# Patient Record
Sex: MALE | Race: WHITE | Employment: FULL TIME | URBAN - METROPOLITAN AREA
[De-identification: names, ages, dates, MRNs, and addresses within clinical notes are randomized per-mention and may not be internally consistent; named-entity substitution may affect disease eponyms.]

---

## 2017-01-17 ENCOUNTER — ALLSCRIPTS OFFICE VISIT (OUTPATIENT)
Dept: OTHER | Facility: OTHER | Age: 37
End: 2017-01-17

## 2017-04-25 ENCOUNTER — GENERIC CONVERSION - ENCOUNTER (OUTPATIENT)
Dept: OTHER | Facility: OTHER | Age: 37
End: 2017-04-25

## 2017-04-26 ENCOUNTER — ALLSCRIPTS OFFICE VISIT (OUTPATIENT)
Dept: OTHER | Facility: OTHER | Age: 37
End: 2017-04-26

## 2017-08-02 ENCOUNTER — ALLSCRIPTS OFFICE VISIT (OUTPATIENT)
Dept: OTHER | Facility: OTHER | Age: 37
End: 2017-08-02

## 2018-01-12 VITALS
DIASTOLIC BLOOD PRESSURE: 68 MMHG | SYSTOLIC BLOOD PRESSURE: 124 MMHG | HEIGHT: 68 IN | TEMPERATURE: 100.4 F | WEIGHT: 221.25 LBS | BODY MASS INDEX: 33.53 KG/M2

## 2018-01-14 VITALS — BODY MASS INDEX: 33.45 KG/M2 | WEIGHT: 220 LBS | SYSTOLIC BLOOD PRESSURE: 130 MMHG | DIASTOLIC BLOOD PRESSURE: 68 MMHG

## 2018-01-14 VITALS
DIASTOLIC BLOOD PRESSURE: 62 MMHG | SYSTOLIC BLOOD PRESSURE: 124 MMHG | WEIGHT: 217.38 LBS | BODY MASS INDEX: 32.94 KG/M2 | HEIGHT: 68 IN

## 2019-04-12 ENCOUNTER — OFFICE VISIT (OUTPATIENT)
Dept: FAMILY MEDICINE CLINIC | Facility: CLINIC | Age: 39
End: 2019-04-12
Payer: COMMERCIAL

## 2019-04-12 VITALS
WEIGHT: 212 LBS | TEMPERATURE: 98.5 F | BODY MASS INDEX: 31.4 KG/M2 | SYSTOLIC BLOOD PRESSURE: 130 MMHG | DIASTOLIC BLOOD PRESSURE: 82 MMHG | HEIGHT: 69 IN

## 2019-04-12 DIAGNOSIS — M53.3 PAIN IN THE COCCYX: Primary | ICD-10-CM

## 2019-04-12 DIAGNOSIS — F90.0 ATTENTION DEFICIT HYPERACTIVITY DISORDER (ADHD), PREDOMINANTLY INATTENTIVE TYPE: ICD-10-CM

## 2019-04-12 DIAGNOSIS — Z23 ENCOUNTER FOR VACCINATION: ICD-10-CM

## 2019-04-12 PROCEDURE — 90471 IMMUNIZATION ADMIN: CPT

## 2019-04-12 PROCEDURE — 99213 OFFICE O/P EST LOW 20 MIN: CPT | Performed by: FAMILY MEDICINE

## 2019-04-12 PROCEDURE — 3008F BODY MASS INDEX DOCD: CPT | Performed by: FAMILY MEDICINE

## 2019-04-12 PROCEDURE — 90715 TDAP VACCINE 7 YRS/> IM: CPT

## 2019-04-12 RX ORDER — MELOXICAM 15 MG/1
15 TABLET ORAL DAILY
Qty: 30 TABLET | Refills: 1 | Status: SHIPPED | OUTPATIENT
Start: 2019-04-12 | End: 2019-07-16 | Stop reason: SDUPTHER

## 2019-04-12 RX ORDER — DEXTROAMPHETAMINE SACCHARATE, AMPHETAMINE ASPARTATE, DEXTROAMPHETAMINE SULFATE AND AMPHETAMINE SULFATE 5; 5; 5; 5 MG/1; MG/1; MG/1; MG/1
1 TABLET ORAL 2 TIMES DAILY
COMMUNITY
Start: 2013-04-04 | End: 2019-04-12 | Stop reason: SDUPTHER

## 2019-04-12 RX ORDER — DEXTROAMPHETAMINE SACCHARATE, AMPHETAMINE ASPARTATE, DEXTROAMPHETAMINE SULFATE AND AMPHETAMINE SULFATE 5; 5; 5; 5 MG/1; MG/1; MG/1; MG/1
1 TABLET ORAL 2 TIMES DAILY
Qty: 60 TABLET | Refills: 0 | Status: SHIPPED | OUTPATIENT
Start: 2019-04-12 | End: 2019-09-24 | Stop reason: SDUPTHER

## 2019-07-15 ENCOUNTER — OFFICE VISIT (OUTPATIENT)
Dept: FAMILY MEDICINE CLINIC | Facility: CLINIC | Age: 39
End: 2019-07-15
Payer: COMMERCIAL

## 2019-07-15 VITALS
TEMPERATURE: 97.2 F | BODY MASS INDEX: 31.37 KG/M2 | DIASTOLIC BLOOD PRESSURE: 70 MMHG | WEIGHT: 207 LBS | HEIGHT: 68 IN | SYSTOLIC BLOOD PRESSURE: 132 MMHG

## 2019-07-15 DIAGNOSIS — S83.207A ACUTE MENISCAL TEAR OF LEFT KNEE, INITIAL ENCOUNTER: Primary | ICD-10-CM

## 2019-07-15 PROCEDURE — 99213 OFFICE O/P EST LOW 20 MIN: CPT | Performed by: FAMILY MEDICINE

## 2019-07-15 NOTE — PROGRESS NOTES
Assessment/Plan:  Patient will start meloxicam daily  Patient will go for x-ray plus or minus MRI  Diagnoses and all orders for this visit:    Acute meniscal tear of left knee, initial encounter  -     XR knee 3 vw right non injury; Future  -     MRI knee left  wo contrast; Future          Subjective:      Patient ID: Aleena Ibarra is a 43 y o  male  Patient is here with left knee pain over the past 3 weeks  The patient has been limping for 3 weeks  Patient did go for jugular day before  Patient did jump down from his pickup truck that morning before pain ensued  The no significant swelling noted  He pain is on the medial aspect  Patient does have cadaver ACL repair left knee from previous injury  This occurred when he was 23years old  Patient did use Ace wrap as well as ice  No redness or bruising noted  The patient does notice some popping and cracking as well as instability      The following portions of the patient's history were reviewed and updated as appropriate: allergies, current medications, past family history, past medical history, past social history, past surgical history and problem list     Review of Systems   Constitutional: Negative  HENT: Negative  Eyes: Negative  Respiratory: Negative  Cardiovascular: Negative  Gastrointestinal: Negative  Endocrine: Negative  Genitourinary: Negative  Musculoskeletal: Positive for arthralgias and gait problem  Skin: Negative  Allergic/Immunologic: Negative  Hematological: Negative  Psychiatric/Behavioral: Negative  Objective:      /70 (BP Location: Right arm, Patient Position: Sitting, Cuff Size: Adult)   Temp (!) 97 2 °F (36 2 °C) (Tympanic)   Ht 5' 8" (1 727 m)   Wt 93 9 kg (207 lb)   BMI 31 47 kg/m²          Physical Exam   Constitutional: He appears well-developed and well-nourished  Musculoskeletal: Normal range of motion  He exhibits tenderness  Left knee no effusion noted    Patient does not have any joint line tenderness  Patient does have some pain with testing MCL  No pain testing LCL  Negative of Lachman test   Positive Apley test    Nursing note and vitals reviewed

## 2019-07-16 DIAGNOSIS — M53.3 PAIN IN THE COCCYX: ICD-10-CM

## 2019-07-16 RX ORDER — MELOXICAM 15 MG/1
15 TABLET ORAL DAILY
Qty: 30 TABLET | Refills: 1 | Status: SHIPPED | OUTPATIENT
Start: 2019-07-16 | End: 2019-08-27 | Stop reason: SDUPTHER

## 2019-08-01 ENCOUNTER — TELEPHONE (OUTPATIENT)
Dept: FAMILY MEDICINE CLINIC | Facility: CLINIC | Age: 39
End: 2019-08-01

## 2019-08-01 DIAGNOSIS — S83.207A ACUTE MENISCAL TEAR OF LEFT KNEE, INITIAL ENCOUNTER: Primary | ICD-10-CM

## 2019-08-01 NOTE — TELEPHONE ENCOUNTER
Call patient  MRI of the knee not being covered  Patient will need to do physical therapy prior to coverage  Referral to physical therapy    Other option would be going to Orthopedics

## 2019-08-01 NOTE — TELEPHONE ENCOUNTER
Pt's insurance denied the auth for MRI knee  The reason for the denial: "documentation submitted failed to indicate the patient has had a recent (within the last 3 months) 6 week trial of doctor prescribed treatment  Treatments may include rest, ice, wraps, and/or elevating leg, medications/drugs to treat pain/swelling, oral or injected steroids, home workout or exercise program, cross training, bracing/splinting and/or casting, or medications injected into the knee "  Please advise how you'd like to proceed       (7/15 was the first visit the patient had for this issue )

## 2019-08-01 NOTE — TELEPHONE ENCOUNTER
Spoke with patient he is aware of the response  He is still in a lot of pain and his mobility is being hindered  Patient would like to see Ortho  Amb Ref was put in the system for ortho and patient was given the phone number to call and schedule

## 2019-08-16 VITALS
DIASTOLIC BLOOD PRESSURE: 78 MMHG | SYSTOLIC BLOOD PRESSURE: 130 MMHG | HEIGHT: 68 IN | BODY MASS INDEX: 31.43 KG/M2 | WEIGHT: 207.4 LBS | HEART RATE: 48 BPM

## 2019-08-16 DIAGNOSIS — M25.562 ACUTE PAIN OF LEFT KNEE: Primary | ICD-10-CM

## 2019-08-16 DIAGNOSIS — M23.8X2 LAXITY OF KNEE JOINT, LEFT: ICD-10-CM

## 2019-08-16 PROCEDURE — 99204 OFFICE O/P NEW MOD 45 MIN: CPT | Performed by: EMERGENCY MEDICINE

## 2019-08-27 DIAGNOSIS — M53.3 PAIN IN THE COCCYX: ICD-10-CM

## 2019-08-27 RX ORDER — MELOXICAM 15 MG/1
15 TABLET ORAL DAILY
Qty: 30 TABLET | Refills: 1 | Status: SHIPPED | OUTPATIENT
Start: 2019-08-27 | End: 2019-09-24 | Stop reason: SDUPTHER

## 2019-08-27 NOTE — TELEPHONE ENCOUNTER
Asking for refill on meloxicam  Pt states the ortho told him to have the med filled by us  Pt is in pain and down to one pill  Please address

## 2019-09-24 ENCOUNTER — OFFICE VISIT (OUTPATIENT)
Dept: FAMILY MEDICINE CLINIC | Facility: CLINIC | Age: 39
End: 2019-09-24
Payer: COMMERCIAL

## 2019-09-24 VITALS
TEMPERATURE: 97 F | BODY MASS INDEX: 31.67 KG/M2 | DIASTOLIC BLOOD PRESSURE: 76 MMHG | SYSTOLIC BLOOD PRESSURE: 122 MMHG | WEIGHT: 209 LBS | HEIGHT: 68 IN

## 2019-09-24 DIAGNOSIS — Z88.9 MULTIPLE ALLERGIES: ICD-10-CM

## 2019-09-24 DIAGNOSIS — F90.0 ATTENTION DEFICIT HYPERACTIVITY DISORDER (ADHD), PREDOMINANTLY INATTENTIVE TYPE: ICD-10-CM

## 2019-09-24 DIAGNOSIS — S83.207D ACUTE MENISCAL TEAR OF LEFT KNEE, SUBSEQUENT ENCOUNTER: ICD-10-CM

## 2019-09-24 DIAGNOSIS — M53.3 PAIN IN THE COCCYX: ICD-10-CM

## 2019-09-24 DIAGNOSIS — Z23 NEEDS FLU SHOT: Primary | ICD-10-CM

## 2019-09-24 PROCEDURE — 99213 OFFICE O/P EST LOW 20 MIN: CPT | Performed by: FAMILY MEDICINE

## 2019-09-24 PROCEDURE — 3008F BODY MASS INDEX DOCD: CPT | Performed by: FAMILY MEDICINE

## 2019-09-24 PROCEDURE — 90682 RIV4 VACC RECOMBINANT DNA IM: CPT

## 2019-09-24 PROCEDURE — 90471 IMMUNIZATION ADMIN: CPT

## 2019-09-24 RX ORDER — MELOXICAM 15 MG/1
15 TABLET ORAL DAILY
Qty: 30 TABLET | Refills: 1 | Status: SHIPPED | OUTPATIENT
Start: 2019-09-24 | End: 2021-12-09 | Stop reason: ALTCHOICE

## 2019-09-24 RX ORDER — DEXTROAMPHETAMINE SACCHARATE, AMPHETAMINE ASPARTATE, DEXTROAMPHETAMINE SULFATE AND AMPHETAMINE SULFATE 5; 5; 5; 5 MG/1; MG/1; MG/1; MG/1
1 TABLET ORAL 2 TIMES DAILY
Qty: 60 TABLET | Refills: 0 | Status: SHIPPED | OUTPATIENT
Start: 2019-09-24 | End: 2020-05-20 | Stop reason: SDUPTHER

## 2019-09-24 NOTE — PROGRESS NOTES
Assessment/Plan:  He flu shot given at this time  Patient will observe URI/out allergic symptoms  Guidance given  Patient will follow up with Orthopedics after MRI  Follow-up in 6 months       Diagnoses and all orders for this visit:    Needs flu shot  -     influenza vaccine, [id number], quadrivalent, recombinant, PF, 0 5 mL, for patients 18 yr+ (FLUBLOK)    Attention deficit hyperactivity disorder (ADHD), predominantly inattentive type  -     amphetamine-dextroamphetamine (ADDERALL) 20 mg tablet; Take 1 tablet (20 mg total) by mouth 2 (two) times a dayMax Daily Amount: 40 mg    Pain in the coccyx  -     meloxicam (MOBIC) 15 mg tablet; Take 1 tablet (15 mg total) by mouth daily    Acute meniscal tear of left knee, subsequent encounter    Multiple allergies            Subjective:        Patient ID: Destiny Chung is a 43 y o  male  Patient is here with ongoing left knee pain  Patient using meloxicam daily which is helping to some degree  Patient has not gone for MRI yet  Patient has seen Orthopedics  The patient okay with flu shot today  No fever chills or other URI symptoms other than mild sore throat  The following portions of the patient's history were reviewed and updated as appropriate: allergies, current medications, past family history, past medical history, past social history, past surgical history and problem list       Review of Systems   Constitutional: Negative  HENT: Positive for sore throat  Eyes: Negative  Respiratory: Negative  Cardiovascular: Negative  Gastrointestinal: Negative  Endocrine: Negative  Genitourinary: Negative  Musculoskeletal: Positive for arthralgias  Skin: Negative  Allergic/Immunologic: Negative  Neurological: Negative  Hematological: Negative  Psychiatric/Behavioral: Negative  Objective:      BMI Counseling: Body mass index is 31 78 kg/m²  Discussed the patient's BMI with him   The BMI is above normal  Nutrition recommendations include reducing portion sizes  /76 (BP Location: Right arm, Patient Position: Sitting, Cuff Size: Large)   Temp (!) 97 °F (36 1 °C) (Tympanic)   Ht 5' 8" (1 727 m)   Wt 94 8 kg (209 lb)   BMI 31 78 kg/m²          Physical Exam   Constitutional: He is oriented to person, place, and time  He appears well-developed and well-nourished  No distress  HENT:   Head: Normocephalic  Right Ear: Hearing, tympanic membrane, external ear and ear canal normal    Left Ear: Hearing, tympanic membrane, external ear and ear canal normal    Mouth/Throat: Oropharyngeal exudate present  Eyes: Pupils are equal, round, and reactive to light  EOM are normal  Right eye exhibits no discharge  Left eye exhibits no discharge  No scleral icterus  Neck: Normal range of motion  Neck supple  No thyromegaly present  Cardiovascular: Normal rate, regular rhythm, normal heart sounds and intact distal pulses  Exam reveals no gallop and no friction rub  No murmur heard  Pulmonary/Chest: Effort normal and breath sounds normal  No respiratory distress  He has no wheezes  He has no rales  He exhibits no tenderness  Musculoskeletal: Normal range of motion  He exhibits deformity  He exhibits no edema or tenderness  Lymphadenopathy:     He has no cervical adenopathy  Neurological: He is oriented to person, place, and time  No cranial nerve deficit  He exhibits normal muscle tone  Coordination normal    Skin: Skin is warm and dry  No rash noted  He is not diaphoretic  No erythema  No pallor  Psychiatric: He has a normal mood and affect  His behavior is normal  Judgment and thought content normal    Nursing note and vitals reviewed

## 2020-05-20 ENCOUNTER — OFFICE VISIT (OUTPATIENT)
Dept: FAMILY MEDICINE CLINIC | Facility: CLINIC | Age: 40
End: 2020-05-20
Payer: COMMERCIAL

## 2020-05-20 VITALS
WEIGHT: 214 LBS | OXYGEN SATURATION: 98 % | HEIGHT: 68 IN | DIASTOLIC BLOOD PRESSURE: 84 MMHG | TEMPERATURE: 96.7 F | SYSTOLIC BLOOD PRESSURE: 134 MMHG | BODY MASS INDEX: 32.43 KG/M2 | HEART RATE: 70 BPM

## 2020-05-20 DIAGNOSIS — F90.0 ATTENTION DEFICIT HYPERACTIVITY DISORDER (ADHD), PREDOMINANTLY INATTENTIVE TYPE: ICD-10-CM

## 2020-05-20 PROCEDURE — 3008F BODY MASS INDEX DOCD: CPT | Performed by: FAMILY MEDICINE

## 2020-05-20 PROCEDURE — 99213 OFFICE O/P EST LOW 20 MIN: CPT | Performed by: FAMILY MEDICINE

## 2020-05-20 RX ORDER — DEXTROAMPHETAMINE SACCHARATE, AMPHETAMINE ASPARTATE, DEXTROAMPHETAMINE SULFATE AND AMPHETAMINE SULFATE 5; 5; 5; 5 MG/1; MG/1; MG/1; MG/1
1 TABLET ORAL 2 TIMES DAILY
Qty: 60 TABLET | Refills: 0 | Status: SHIPPED | OUTPATIENT
Start: 2020-05-20 | End: 2021-03-12 | Stop reason: SDUPTHER

## 2021-03-12 ENCOUNTER — OFFICE VISIT (OUTPATIENT)
Dept: FAMILY MEDICINE CLINIC | Facility: CLINIC | Age: 41
End: 2021-03-12
Payer: COMMERCIAL

## 2021-03-12 VITALS
BODY MASS INDEX: 32.13 KG/M2 | HEIGHT: 68 IN | SYSTOLIC BLOOD PRESSURE: 140 MMHG | WEIGHT: 212 LBS | TEMPERATURE: 97.3 F | DIASTOLIC BLOOD PRESSURE: 80 MMHG

## 2021-03-12 DIAGNOSIS — F90.0 ATTENTION DEFICIT HYPERACTIVITY DISORDER (ADHD), PREDOMINANTLY INATTENTIVE TYPE: ICD-10-CM

## 2021-03-12 PROCEDURE — 4004F PT TOBACCO SCREEN RCVD TLK: CPT | Performed by: FAMILY MEDICINE

## 2021-03-12 PROCEDURE — 3008F BODY MASS INDEX DOCD: CPT | Performed by: FAMILY MEDICINE

## 2021-03-12 PROCEDURE — 99213 OFFICE O/P EST LOW 20 MIN: CPT | Performed by: FAMILY MEDICINE

## 2021-03-12 RX ORDER — DEXTROAMPHETAMINE SACCHARATE, AMPHETAMINE ASPARTATE, DEXTROAMPHETAMINE SULFATE AND AMPHETAMINE SULFATE 5; 5; 5; 5 MG/1; MG/1; MG/1; MG/1
1 TABLET ORAL 2 TIMES DAILY
Qty: 60 TABLET | Refills: 0 | Status: SHIPPED | OUTPATIENT
Start: 2021-03-12 | End: 2021-10-04 | Stop reason: SDUPTHER

## 2021-03-12 NOTE — PROGRESS NOTES
Assessment/Plan:  Patient will get MRI of the left knee when able to  Patient will continue with Adderall for ADHD as directed  Diagnoses and all orders for this visit:    Attention deficit hyperactivity disorder (ADHD), predominantly inattentive type  -     amphetamine-dextroamphetamine (ADDERALL) 20 mg tablet; Take 1 tablet (20 mg total) by mouth 2 (two) times a dayMax Daily Amount: 40 mg            Subjective:        Patient ID: Wade Nagy is a 38 y o  male  Patient here to follow-up on ADHD  Patient stable  Patient not taking medicine on daily basis but medicine does work when he does take it  Patient have ongoing left knee pain  Patient very busy with work  The following portions of the patient's history were reviewed and updated as appropriate: allergies, current medications, past family history, past medical history, past social history, past surgical history and problem list       Review of Systems   Constitutional: Negative  HENT: Negative  Eyes: Negative  Respiratory: Negative  Cardiovascular: Negative  Gastrointestinal: Negative  Endocrine: Negative  Genitourinary: Negative  Musculoskeletal: Positive for arthralgias  Skin: Negative  Allergic/Immunologic: Negative  Neurological: Negative  Hematological: Negative  Psychiatric/Behavioral: Negative  Objective:      BMI Counseling: Body mass index is 32 23 kg/m²  The BMI is above normal  Nutrition recommendations include decreasing portion sizes  Exercise recommendations include moderate physical activity 150 minutes/week  Depression Screening and Follow-up Plan: Clincally patient does not have depression  No treatment is required  Tobacco Cessation Counseling: Tobacco cessation counseling was provided  The patient is sincerely urged to quit consumption of tobacco  He is not ready to quit tobacco  Medication options discussed             /80 (BP Location: Left arm, Patient Position: Sitting, Cuff Size: Standard)   Temp (!) 97 3 °F (36 3 °C) (Tympanic)   Ht 5' 8" (1 727 m)   Wt 96 2 kg (212 lb)   BMI 32 23 kg/m²          Physical Exam

## 2021-03-16 ENCOUNTER — TELEPHONE (OUTPATIENT)
Dept: FAMILY MEDICINE CLINIC | Facility: CLINIC | Age: 41
End: 2021-03-16

## 2021-03-24 ENCOUNTER — TELEPHONE (OUTPATIENT)
Dept: FAMILY MEDICINE CLINIC | Facility: CLINIC | Age: 41
End: 2021-03-24

## 2021-03-24 NOTE — TELEPHONE ENCOUNTER
Patient was approved today for the generic brand of Adderall 20 mg tabs fr 03/24/21-03/24/22; approval # [id number]  [facility] pharmacy was noticed today and also patient of approval   Call patient he is out of town, so I suggested he contact his pharmacy of choice [facility]- to inform them when he returns back to refill

## 2021-05-12 ENCOUNTER — TELEPHONE (OUTPATIENT)
Dept: FAMILY MEDICINE CLINIC | Facility: CLINIC | Age: 41
End: 2021-05-12

## 2021-05-12 NOTE — TELEPHONE ENCOUNTER
Patient last seen 3/12/2021  Patient is calling in about his Left knee pain  Patient had  surgery 20 years ago and pain since 2019  Patient said we ordered a mri in the past but he was unable to do this because of money and COVID  Patient would like to know if your willing to order a MRI again so he can schedule  I told hime he may need an updated appointment pertaining to this past problem but we will call him back if we have any concerns or questions

## 2021-05-13 NOTE — TELEPHONE ENCOUNTER
Call patient  Patient will need appointment for the pain and schedule MRI  Patient was seen in 2019 for this issue    Insurance company will  Want updated notes etcetera

## 2021-05-13 NOTE — TELEPHONE ENCOUNTER
Spoke with patient about need for appt   He needs late afternoon appt, did not want to wait to see Dr Ingrid Caba, booked with Dr Berenice Pulido

## 2021-05-21 ENCOUNTER — OFFICE VISIT (OUTPATIENT)
Dept: FAMILY MEDICINE CLINIC | Facility: CLINIC | Age: 41
End: 2021-05-21
Payer: COMMERCIAL

## 2021-05-21 VITALS
DIASTOLIC BLOOD PRESSURE: 76 MMHG | BODY MASS INDEX: 32.89 KG/M2 | TEMPERATURE: 97 F | HEIGHT: 68 IN | SYSTOLIC BLOOD PRESSURE: 130 MMHG | WEIGHT: 217 LBS

## 2021-05-21 DIAGNOSIS — M25.562 CHRONIC PAIN OF LEFT KNEE: Primary | ICD-10-CM

## 2021-05-21 DIAGNOSIS — M25.30 JOINT INSTABILITY: ICD-10-CM

## 2021-05-21 DIAGNOSIS — G89.29 CHRONIC PAIN OF LEFT KNEE: Primary | ICD-10-CM

## 2021-05-21 DIAGNOSIS — Z87.828 S/P ACL TEAR: ICD-10-CM

## 2021-05-21 PROCEDURE — 99213 OFFICE O/P EST LOW 20 MIN: CPT | Performed by: FAMILY MEDICINE

## 2021-05-21 PROCEDURE — 3725F SCREEN DEPRESSION PERFORMED: CPT | Performed by: FAMILY MEDICINE

## 2021-05-21 PROCEDURE — 4004F PT TOBACCO SCREEN RCVD TLK: CPT | Performed by: FAMILY MEDICINE

## 2021-05-21 PROCEDURE — 3008F BODY MASS INDEX DOCD: CPT | Performed by: FAMILY MEDICINE

## 2021-06-05 ENCOUNTER — HOSPITAL ENCOUNTER (OUTPATIENT)
Dept: MRI IMAGING | Facility: HOSPITAL | Age: 41
Discharge: HOME | End: 2021-06-05
Payer: COMMERCIAL

## 2021-06-05 ENCOUNTER — IMMUNIZATIONS (OUTPATIENT)
Dept: FAMILY MEDICINE CLINIC | Facility: HOSPITAL | Age: 41
End: 2021-06-05
Payer: COMMERCIAL

## 2021-06-05 DIAGNOSIS — G89.29 CHRONIC PAIN OF LEFT KNEE: ICD-10-CM

## 2021-06-05 DIAGNOSIS — Z87.828 S/P ACL TEAR: ICD-10-CM

## 2021-06-05 DIAGNOSIS — M25.562 CHRONIC PAIN OF LEFT KNEE: ICD-10-CM

## 2021-06-05 DIAGNOSIS — Z23 ENCOUNTER FOR IMMUNIZATION: Primary | ICD-10-CM

## 2021-06-05 DIAGNOSIS — M25.30 JOINT INSTABILITY: ICD-10-CM

## 2021-06-05 PROCEDURE — 73721 MRI JNT OF LWR EXTRE W/O DYE: CPT

## 2021-06-05 PROCEDURE — 0011A SARS-COV-2 / COVID-19 MRNA VACCINE (MODERNA) 100 MCG: CPT

## 2021-06-05 PROCEDURE — G1004 CDSM NDSC: HCPCS

## 2021-06-05 PROCEDURE — 91301 SARS-COV-2 / COVID-19 MRNA VACCINE (MODERNA) 100 MCG: CPT

## 2021-06-28 VITALS
SYSTOLIC BLOOD PRESSURE: 143 MMHG | WEIGHT: 218 LBS | HEIGHT: 68 IN | DIASTOLIC BLOOD PRESSURE: 83 MMHG | HEART RATE: 51 BPM | BODY MASS INDEX: 33.04 KG/M2

## 2021-06-28 DIAGNOSIS — M23.8X2 LAXITY OF KNEE JOINT, LEFT: ICD-10-CM

## 2021-06-28 DIAGNOSIS — M25.562 ACUTE PAIN OF LEFT KNEE: ICD-10-CM

## 2021-06-28 DIAGNOSIS — S83.242D OTHER TEAR OF MEDIAL MENISCUS OF LEFT KNEE AS CURRENT INJURY, SUBSEQUENT ENCOUNTER: Primary | ICD-10-CM

## 2021-06-28 DIAGNOSIS — Z98.890 HISTORY OF REPAIR OF ACL: ICD-10-CM

## 2021-06-28 PROCEDURE — 4004F PT TOBACCO SCREEN RCVD TLK: CPT | Performed by: EMERGENCY MEDICINE

## 2021-06-28 PROCEDURE — 99213 OFFICE O/P EST LOW 20 MIN: CPT | Performed by: EMERGENCY MEDICINE

## 2021-06-28 PROCEDURE — 3008F BODY MASS INDEX DOCD: CPT | Performed by: EMERGENCY MEDICINE

## 2021-06-28 NOTE — PROGRESS NOTES
Assessment/Plan:    Diagnoses and all orders for this visit:    Other tear of medial meniscus of left knee as current injury, subsequent encounter  -     Ambulatory referral to Orthopedic Surgery; Future    Acute pain of left knee  -     Ambulatory referral to Orthopedic Surgery; Future    Laxity of knee joint, left  -     Ambulatory referral to Orthopedic Surgery; Future    History of repair of ACL        Return if symptoms worsen or fail to improve  Chief Complaint:     Chief Complaint   Patient presents with    Left Knee - Follow-up       Subjective:   Patient ID: Anitha Negrete is a 38 y o  male  Patient returns to review MRI of the left knee continues with medial pain  He states the knee does not feel right and is weak  Initial evaluation August 2019      Review of Systems    The following portions of the patient's chart were reviewed and updated as appropriate: Allergy:  No Known Allergies    History reviewed  No pertinent past medical history      Past Surgical History:   Procedure Laterality Date    KNEE SURGERY         Social History     Socioeconomic History    Marital status: /Civil Union     Spouse name: Not on file    Number of children: Not on file    Years of education: Not on file    Highest education level: Not on file   Occupational History    Not on file   Tobacco Use    Smoking status: Current Some Day Smoker    Smokeless tobacco: Never Used    Tobacco comment: Occasional cigar   Vaping Use    Vaping Use: Never used   Substance and Sexual Activity    Alcohol use: Yes     Comment: Social     Drug use: Not Currently    Sexual activity: Not Currently   Other Topics Concern    Not on file   Social History Narrative    Not on file     Social Determinants of Health     Financial Resource Strain: Low Risk     Difficulty of Paying Living Expenses: Not hard at all   Food Insecurity: No Food Insecurity    Worried About Running Out of Food in the Last Year: Never true  Ran Out of Food in the Last Year: Never true   Transportation Needs: No Transportation Needs    Lack of Transportation (Medical): No    Lack of Transportation (Non-Medical): No   Physical Activity: Insufficiently Active    Days of Exercise per Week: 3 days    Minutes of Exercise per Session: 30 min   Stress: No Stress Concern Present    Feeling of Stress : Not at all   Social Connections:  Moderately Integrated    Frequency of Communication with Friends and Family: More than three times a week    Frequency of Social Gatherings with Friends and Family: More than three times a week    Attends [employer] Services: 1 to 4 times per year    Active Member of [employer] or Organizations: No    Attends Club or Organization Meetings: Never    Marital Status:    Intimate Partner Violence: Not At Risk    Fear of Current or Ex-Partner: No    Emotionally Abused: No    Physically Abused: No    Sexually Abused: No       Family History   Problem Relation Age of Onset    No Known Problems Mother     Cancer Family     Heart disease Family     Hypertension Family        Medications:    Current Outpatient Medications:     amphetamine-dextroamphetamine (ADDERALL) 20 mg tablet, Take 1 tablet (20 mg total) by mouth 2 (two) times a dayMax Daily Amount: 40 mg, Disp: 60 tablet, Rfl: 0    meloxicam (MOBIC) 15 mg tablet, Take 1 tablet (15 mg total) by mouth daily (Patient not taking: Reported on 5/21/2021), Disp: 30 tablet, Rfl: 1    Patient Active Problem List   Diagnosis    Attention deficit hyperactivity disorder (ADHD), predominantly inattentive type    Pain in the coccyx    Acute meniscal tear of left knee    Multiple allergies    Chronic pain of left knee    S/P ACL tear    Joint instability       Objective:  /83   Pulse (!) 51   Ht 5' 8" (1 727 m)   Wt 98 9 kg (218 lb)   BMI 33 15 kg/m²     Ortho Exam    Physical Exam      Neurologic Exam    Procedures    I have personally reviewed the written report of the pertinent studies  MRI Left Knee  IMPRESSION:     Large radial tear of the medial meniscus at the junction of the body and posterior horn (series 4 images 18-20 )     The ACL graft is intact, although there is mild angulation of the graft, with slightly anteriorly placed tibial tunnel and cystic changes in the tunnel    Findings are suspicious for roof impingement of the ACL graft (series 4 images 11-15 )

## 2021-07-19 ENCOUNTER — APPOINTMENT (OUTPATIENT)
Dept: RADIOLOGY | Facility: MEDICAL CENTER | Age: 41
End: 2021-07-19
Payer: COMMERCIAL

## 2021-07-19 VITALS
WEIGHT: 219 LBS | HEIGHT: 68 IN | SYSTOLIC BLOOD PRESSURE: 122 MMHG | BODY MASS INDEX: 33.19 KG/M2 | DIASTOLIC BLOOD PRESSURE: 80 MMHG

## 2021-07-19 DIAGNOSIS — M25.562 CHRONIC PAIN OF LEFT KNEE: ICD-10-CM

## 2021-07-19 DIAGNOSIS — G89.29 CHRONIC PAIN OF LEFT KNEE: ICD-10-CM

## 2021-07-19 DIAGNOSIS — M25.30 JOINT INSTABILITY: ICD-10-CM

## 2021-07-19 DIAGNOSIS — S83.242D OTHER TEAR OF MEDIAL MENISCUS OF LEFT KNEE AS CURRENT INJURY, SUBSEQUENT ENCOUNTER: ICD-10-CM

## 2021-07-19 DIAGNOSIS — M23.8X2 LAXITY OF KNEE JOINT, LEFT: ICD-10-CM

## 2021-07-19 DIAGNOSIS — T84.89XA ACL GRAFT TEAR, INITIAL ENCOUNTER (HCC): Primary | ICD-10-CM

## 2021-07-19 DIAGNOSIS — Z87.828 S/P ACL TEAR: ICD-10-CM

## 2021-07-19 DIAGNOSIS — M25.562 ACUTE PAIN OF LEFT KNEE: ICD-10-CM

## 2021-07-19 PROCEDURE — 99214 OFFICE O/P EST MOD 30 MIN: CPT | Performed by: ORTHOPAEDIC SURGERY

## 2021-07-19 PROCEDURE — 73560 X-RAY EXAM OF KNEE 1 OR 2: CPT

## 2021-07-19 NOTE — PATIENT INSTRUCTIONS
Anterolateral ligament reconstruction - ALL   ACL reconstruction with autograft quadriceps tendon graft and medial menisectomy

## 2021-07-19 NOTE — PROGRESS NOTES
Ortho Sports Medicine Knee New Patient Visit     Assesment:   38 y o  male left knee instability with ACL re-tear from previous surgery with  Enlarged tibial tunnel seen on prior MRI and x-ray    PLAN:    Conservative treatment:    Ice to knee for 20 minutes at least 1-2 times daily  Imaging: All imaging from today was reviewed by myself and explained to the patient  We will obtain a CT scan for surgical planning purposes  Injection:    No Injection planned at this time  Surgery:     No surgery is recommended at this point, continue with conservative treatment plan as noted  Discussed preforming this surgery in 2 stages pending CT scan  Due to the size of his tibial tunnels we may consider adding bone dowels into the previous tunnel then waiting 3 months before preforming the ACLr with quad tendon  autograft, autograft IT band  Anterolateral ligament reconstruction, and possible medial menisectomy vs repair  All of his questions were answered in regards to this we will discuss this in further detail after obtaining the CT  To determine if this could be done a 1 stage or 2 stage surgery    Follow up:    No follow-ups on file  No chief complaint on file  History of Present Illness: The patient is a 38 y o  male whose occupation is , referred to me by their primary care physician, seen in clinic for consultation of left knee pain  Pain is located medial   The patient rates the pain as a 3/10  The pain has been present for 2 years  The patient sustained an injury 2 years ago when he was stepping out of his truck and landed on his foot and felt a pop with pain in the medial aspect of the knee  The mechanism of injury was a twist  He had an ACL reconstruction in 2001 and had felt episodes of the knee giving out a few times a month  His pain is characterized as dull, achy  The pain is present at all times  Pain is improved by rest, ice and NSAIDS    Pain is aggravated by stairs, squatting, weight bearing, running, walking, sitting, standing and pivoting on a planted foot  Symptoms include clicking, catching, popping, cracking and swelling  The patient has tried rest, ice and NSAIDS  Knee Surgical History:  Left knee ACL allograft reconstruction - 2001    Past Medical, Social and Family History:  History reviewed  No pertinent past medical history  Past Surgical History:   Procedure Laterality Date    KNEE SURGERY       No Known Allergies  Current Outpatient Medications on File Prior to Visit   Medication Sig Dispense Refill    amphetamine-dextroamphetamine (ADDERALL) 20 mg tablet Take 1 tablet (20 mg total) by mouth 2 (two) times a dayMax Daily Amount: 40 mg 60 tablet 0    meloxicam (MOBIC) 15 mg tablet Take 1 tablet (15 mg total) by mouth daily (Patient not taking: Reported on 5/21/2021) 30 tablet 1     No current facility-administered medications on file prior to visit       Social History     Socioeconomic History    Marital status: /Civil Union     Spouse name: Not on file    Number of children: Not on file    Years of education: Not on file    Highest education level: Not on file   Occupational History    Not on file   Tobacco Use    Smoking status: Current Some Day Smoker    Smokeless tobacco: Never Used    Tobacco comment: Occasional cigar   Vaping Use    Vaping Use: Never used   Substance and Sexual Activity    Alcohol use: Yes     Comment: Social     Drug use: Not Currently    Sexual activity: Not Currently   Other Topics Concern    Not on file   Social History Narrative    Not on file     Social Determinants of Health     Financial Resource Strain: Low Risk     Difficulty of Paying Living Expenses: Not hard at all   Food Insecurity: No Food Insecurity    Worried About Running Out of Food in the Last Year: Never true    Chauncey of Food in the Last Year: Never true   Transportation Needs: No Transportation Needs    Lack of Transportation (Medical): No    Lack of Transportation (Non-Medical): No   Physical Activity: Insufficiently Active    Days of Exercise per Week: 3 days    Minutes of Exercise per Session: 30 min   Stress: No Stress Concern Present    Feeling of Stress : Not at all   Social Connections: Moderately Integrated    Frequency of Communication with Friends and Family: More than three times a week    Frequency of Social Gatherings with Friends and Family: More than three times a week    Attends R[employer] Services: 1 to 4 times per year    Active Member of [employer] or Organizations: No    Attends Club or Organization Meetings: Never    Marital Status:    Intimate Partner Violence: Not At Risk    Fear of Current or Ex-Partner: No    Emotionally Abused: No    Physically Abused: No    Sexually Abused: No         I have reviewed the past medical, surgical, social and family history, medications and allergies as documented in the EMR  Review of systems: ROS is negative other than that noted in the HPI  Constitutional: Negative for fatigue and fever  HENT: Negative for sore throat  Respiratory: Negative for shortness of breath  Cardiovascular: Negative for chest pain  Gastrointestinal: Negative for abdominal pain  Endocrine: Negative for cold intolerance and heat intolerance  Genitourinary: Negative for flank pain  Musculoskeletal: Negative for back pain  Skin: Negative for rash  Allergic/Immunologic: Negative for immunocompromised state  Neurological: Negative for dizziness  Psychiatric/Behavioral: Negative for agitation  Physical Exam:    Blood pressure 122/80, height 5' 8" (1 727 m), weight 99 3 kg (219 lb)      General/Constitutional: NAD, well developed, well nourished  HENT: Normocephalic, atraumatic  CV: Intact distal pulses, regular rate  Resp: No respiratory distress or labored breathing  Lymphatic: No lymphadenopathy palpated  Neuro: Alert and Oriented x 3, no focal deficits  Psych: Normal mood, normal affect, normal judgement, normal behavior  Skin: Warm, dry, no rashes, no erythema      Knee Exam (focused): RIGHT LEFT   ROM:   0-130 0-130   Palpation: Effusion negative negative     MJL tenderness Negative Positive     LJL tenderness Negative Negative   Meniscus: Marium Negative Positive    Apley's Compression Negative Positive   Instability: Varus stable stable     Valgus stable stable   Special Tests: Lachman Negative Positive     Posterior drawer Negative Negative     Anterior drawer Negative Positive     Pivot shift not tested not tested     Dial not tested not tested   Patella: Palpation no tenderness no tenderness     Mobility 1/4 1/4     Apprehension Negative Negative   Other: Single leg 1/4 squat not tested not tested      LE NV Exam: +2 DP/PT pulses bilaterally  Sensation intact to light touch L2-S1 bilaterally     Bilateral hip ROM demonstrates no pain actively or passively    No calf tenderness to palpation bilaterally    Knee Imaging    X-rays of the left knee were reviewed, which demonstrate 1 97mm x 2 38mm tunnel in the tibia from previous ACL reconstruction  Minimal tricompartmental OA  I have reviewed the radiology report and do not currently have a radiology reading from [facility], but will check the result once the reading is performed  MRI of the left knee were reviewed, which demonstrate large radial tear of the medial meniscus  Previous ACLr looks to be not completely intact  I have reviewed the radiology report and partially agree with their impression        Scribe Attestation    I,:  Tiarra Gregg am acting as a scribe while in the presence of the attending physician :       I,:  Galen Hickman, DO personally performed the services described in this documentation    as scribed in my presence :

## 2021-07-25 ENCOUNTER — HOSPITAL ENCOUNTER (OUTPATIENT)
Dept: CT IMAGING | Facility: HOSPITAL | Age: 41
Discharge: HOME | End: 2021-07-25
Payer: COMMERCIAL

## 2021-07-25 DIAGNOSIS — M25.562 ACUTE PAIN OF LEFT KNEE: ICD-10-CM

## 2021-07-25 DIAGNOSIS — S83.242D OTHER TEAR OF MEDIAL MENISCUS OF LEFT KNEE AS CURRENT INJURY, SUBSEQUENT ENCOUNTER: ICD-10-CM

## 2021-07-25 DIAGNOSIS — M23.8X2 LAXITY OF KNEE JOINT, LEFT: ICD-10-CM

## 2021-07-25 DIAGNOSIS — T84.89XA ACL GRAFT TEAR, INITIAL ENCOUNTER (HCC): ICD-10-CM

## 2021-07-25 PROCEDURE — 73700 CT LOWER EXTREMITY W/O DYE: CPT

## 2021-08-09 ENCOUNTER — OFFICE VISIT (OUTPATIENT)
Dept: OBGYN CLINIC | Facility: MEDICAL CENTER | Age: 41
End: 2021-08-09
Payer: COMMERCIAL

## 2021-08-09 VITALS
BODY MASS INDEX: 33.19 KG/M2 | DIASTOLIC BLOOD PRESSURE: 87 MMHG | HEIGHT: 68 IN | WEIGHT: 219 LBS | HEART RATE: 85 BPM | SYSTOLIC BLOOD PRESSURE: 134 MMHG

## 2021-08-09 DIAGNOSIS — T84.89XD ACL GRAFT TEAR, SUBSEQUENT ENCOUNTER: ICD-10-CM

## 2021-08-09 DIAGNOSIS — S83.207D ACUTE MENISCAL TEAR OF LEFT KNEE, SUBSEQUENT ENCOUNTER: Primary | ICD-10-CM

## 2021-08-09 PROCEDURE — 3008F BODY MASS INDEX DOCD: CPT | Performed by: ORTHOPAEDIC SURGERY

## 2021-08-09 PROCEDURE — 99214 OFFICE O/P EST MOD 30 MIN: CPT | Performed by: ORTHOPAEDIC SURGERY

## 2021-08-09 PROCEDURE — 4004F PT TOBACCO SCREEN RCVD TLK: CPT | Performed by: ORTHOPAEDIC SURGERY

## 2021-08-09 NOTE — PROGRESS NOTES
Ortho Sports Medicine Knee Follow Up Visit     Assesment:     38 y o  male left knee instability with ACL re-tear from previous surgery with  Enlarged tibial tunnel seen on prior MRI and x-ray with medial meniscus tear    Plan:    Conservative treatment:    Ice to knee for 20 minutes at least 1-2 times daily  OTC NSAIDS prn for pain  Imaging: All imaging from today was reviewed by myself and explained to the patient  Injection:    No Injection planned at this time  Surgery: All of the risks and benefits of operative treatment were explained to the patient, as well as the risks and benefits of any alternative treatment options, including nonoperative care  The risks of surgical treatement include, but are not limited to, infection, bleeding, blood clot, neurovascular damage, need for further surgery, continued pain, cardiovascular risk, and anesthesia risk  Discussed at length the different surgical options which consisted of just doing a surgical arthroscopy to address the medial meniscus tear,  OR Performing surgical arthroscopy to address a medial meniscus tear and do bone dowel grafting of the tibia and or femoral tunnels with the plan for future surgical arthroscopy with ACL reconstruction revision  Patient instructed that he does not feel that instability is his main symptom involving the knee  He states that his main problem involving the knee is constant dull achy sensation about the medial aspect of the knee where the meniscus tear is  I instructed that if I were to only perform surgical arthroscopy to address the medial meniscus tear I would not be solving the instability sensation that he feels  Instructed that I am comfortable proceeding forward with this surgical option as long as he understands that the instability will not be resolved in this surgery and that may have higher risk of osteoarthritis in the knee with future shifting and instability events        Patient would like to take some time to think about surgical options and return to discuss possible future surgical plan    Follow up:    Return in about 4 weeks (around 9/6/2021), or if symptoms worsen or fail to improve, for Recheck  Chief Complaint   Patient presents with    Left Knee - Follow-up       History of Present Illness: The patient is returns for follow up of results of his CT performed on July 25, 2021  Since the prior visit, He reports no improvement  Pain is located deep inside of his knee located on both sides  He states that his left knee does not feel as strong as his right knee  Pain is improved by rest, ice and NSAIDS  Pain is aggravated by stairs, squatting, weight bearing, running, walking, sitting, standing and pivoting on a planted foot  Symptoms include clicking, catching, popping, cracking and swelling  He states his pain is not severe and is a dull pain  The patient has tried rest, ice and NSAIDS  Knee Surgical History:  Left knee ACL allograft reconstruction in 2001    Past Medical, Social and Family History:  History reviewed  No pertinent past medical history  Past Surgical History:   Procedure Laterality Date    KNEE SURGERY       No Known Allergies  Current Outpatient Medications on File Prior to Visit   Medication Sig Dispense Refill    amphetamine-dextroamphetamine (ADDERALL) 20 mg tablet Take 1 tablet (20 mg total) by mouth 2 (two) times a dayMax Daily Amount: 40 mg 60 tablet 0    meloxicam (MOBIC) 15 mg tablet Take 1 tablet (15 mg total) by mouth daily (Patient not taking: Reported on 8/9/2021) 30 tablet 1     No current facility-administered medications on file prior to visit       Social History     Socioeconomic History    Marital status: /Civil Union     Spouse name: Not on file    Number of children: Not on file    Years of education: Not on file    Highest education level: Not on file   Occupational History    Not on file   Tobacco Use    Smoking status: Current Some Day Smoker    Smokeless tobacco: Never Used    Tobacco comment: Occasional cigar   Vaping Use    Vaping Use: Never used   Substance and Sexual Activity    Alcohol use: Yes     Comment: Social     Drug use: Not Currently    Sexual activity: Not Currently   Other Topics Concern    Not on file   Social History Narrative    Not on file     Social Determinants of Health     Financial Resource Strain: Low Risk     Difficulty of Paying Living Expenses: Not hard at all   Food Insecurity: No Food Insecurity    Worried About Running Out of Food in the Last Year: Never true    Chauncey of Food in the Last Year: Never true   Transportation Needs: No Transportation Needs    Lack of Transportation (Medical): No    Lack of Transportation (Non-Medical): No   Physical Activity: Insufficiently Active    Days of Exercise per Week: 3 days    Minutes of Exercise per Session: 30 min   Stress: No Stress Concern Present    Feeling of Stress : Not at all   Social Connections: Moderately Integrated    Frequency of Communication with Friends and Family: More than three times a week    Frequency of Social Gatherings with Friends and Family: More than three times a week    Attends Rastafari Services: 1 to 4 times per year    Active Member of [employer] or Organizations: No    Attends Club or Organization Meetings: Never    Marital Status:    Intimate Partner Violence: Not At Risk    Fear of Current or Ex-Partner: No    Emotionally Abused: No    Physically Abused: No    Sexually Abused: No         I have reviewed the past medical, surgical, social and family history, medications and allergies as documented in the EMR  Review of systems: ROS is negative other than that noted in the HPI  Constitutional: Negative for fatigue and fever  Physical Exam:    Blood pressure 134/87, pulse 85, height 5' 8" (1 727 m), weight 99 3 kg (219 lb)      General/Constitutional: NAD, well developed, well nourished  HENT: Normocephalic, atraumatic  CV: Intact distal pulses, regular rate  Resp: No respiratory distress or labored breathing  Lymphatic: No lymphadenopathy palpated  Neuro: Alert and Oriented x 3, no focal deficits  Psych: Normal mood, normal affect, normal judgement, normal behavior  Skin: Warm, dry, no rashes, no erythema      Knee Exam (focused): RIGHT LEFT   ROM:   0-130 0-130   Palpation: Effusion negative minimal     MJL tenderness Negative Positive     LJL tenderness Negative Negative   Meniscus:  Marium Negative Positive    Apley's Compression Negative Positive   Instability: Varus stable stable     Valgus stable stable   Special Tests: Lachman Negative Positive     Posterior drawer Negative Negative     Anterior drawer Negative Positive     Pivot shift not tested not tested     Dial not tested not tested   Patella: Palpation no tenderness no tenderness     Mobility 1/4 1/4     Apprehension Negative Negative   Other: Single leg 1/4 squat not tested not tested           LE NV Exam: +2 DP/PT pulses bilaterally  Sensation intact to light touch L2-S1 bilaterally    No calf tenderness to palpation bilaterally      Knee Imaging    No imaging was performed today      Scribe Attestation    I,:  Severo Roles am acting as a scribe while in the presence of the attending physician :       I,:  Tram Reynolds DO personally performed the services described in this documentation    as scribed in my presence :

## 2021-10-04 ENCOUNTER — OFFICE VISIT (OUTPATIENT)
Dept: FAMILY MEDICINE CLINIC | Facility: CLINIC | Age: 41
End: 2021-10-04
Payer: COMMERCIAL

## 2021-10-04 ENCOUNTER — TELEPHONE (OUTPATIENT)
Dept: OBGYN CLINIC | Facility: HOSPITAL | Age: 41
End: 2021-10-04

## 2021-10-04 VITALS
TEMPERATURE: 96.8 F | DIASTOLIC BLOOD PRESSURE: 78 MMHG | SYSTOLIC BLOOD PRESSURE: 124 MMHG | BODY MASS INDEX: 32.86 KG/M2 | HEIGHT: 68 IN | WEIGHT: 216.8 LBS

## 2021-10-04 DIAGNOSIS — Z00.00 WELL ADULT EXAM: ICD-10-CM

## 2021-10-04 DIAGNOSIS — F90.0 ATTENTION DEFICIT HYPERACTIVITY DISORDER (ADHD), PREDOMINANTLY INATTENTIVE TYPE: Primary | ICD-10-CM

## 2021-10-04 PROCEDURE — 99213 OFFICE O/P EST LOW 20 MIN: CPT | Performed by: FAMILY MEDICINE

## 2021-10-04 PROCEDURE — 99396 PREV VISIT EST AGE 40-64: CPT | Performed by: FAMILY MEDICINE

## 2021-10-04 RX ORDER — DEXTROAMPHETAMINE SACCHARATE, AMPHETAMINE ASPARTATE, DEXTROAMPHETAMINE SULFATE AND AMPHETAMINE SULFATE 5; 5; 5; 5 MG/1; MG/1; MG/1; MG/1
1 TABLET ORAL 2 TIMES DAILY
Qty: 60 TABLET | Refills: 0 | Status: SHIPPED | OUTPATIENT
Start: 2021-10-04 | End: 2022-04-25 | Stop reason: SDUPTHER

## 2021-10-18 ENCOUNTER — OFFICE VISIT (OUTPATIENT)
Dept: OBGYN CLINIC | Facility: MEDICAL CENTER | Age: 41
End: 2021-10-18
Payer: COMMERCIAL

## 2021-10-18 VITALS
WEIGHT: 220 LBS | BODY MASS INDEX: 33.34 KG/M2 | HEIGHT: 68 IN | SYSTOLIC BLOOD PRESSURE: 133 MMHG | DIASTOLIC BLOOD PRESSURE: 78 MMHG

## 2021-10-18 DIAGNOSIS — T84.89XD ACL GRAFT TEAR, SUBSEQUENT ENCOUNTER: ICD-10-CM

## 2021-10-18 DIAGNOSIS — S83.207D ACUTE MENISCAL TEAR OF LEFT KNEE, SUBSEQUENT ENCOUNTER: Primary | ICD-10-CM

## 2021-10-18 DIAGNOSIS — Z01.818 PRE-OP TESTING: ICD-10-CM

## 2021-10-18 PROCEDURE — 4004F PT TOBACCO SCREEN RCVD TLK: CPT | Performed by: ORTHOPAEDIC SURGERY

## 2021-10-18 PROCEDURE — 99214 OFFICE O/P EST MOD 30 MIN: CPT | Performed by: ORTHOPAEDIC SURGERY

## 2021-10-18 PROCEDURE — 3008F BODY MASS INDEX DOCD: CPT | Performed by: ORTHOPAEDIC SURGERY

## 2021-10-18 RX ORDER — TRAMADOL HYDROCHLORIDE 50 MG/1
50 TABLET ORAL EVERY 6 HOURS PRN
Status: CANCELLED | OUTPATIENT
Start: 2021-10-18

## 2021-10-18 RX ORDER — CEFAZOLIN SODIUM 2 G/50ML
2000 SOLUTION INTRAVENOUS ONCE
Status: CANCELLED | OUTPATIENT
Start: 2021-12-15 | End: 2021-10-18

## 2021-10-18 RX ORDER — ACETAMINOPHEN 325 MG/1
650 TABLET ORAL EVERY 6 HOURS PRN
Status: CANCELLED | OUTPATIENT
Start: 2021-10-18

## 2021-11-13 ENCOUNTER — APPOINTMENT (OUTPATIENT)
Dept: LAB | Facility: HOSPITAL | Age: 41
End: 2021-11-13
Payer: COMMERCIAL

## 2021-11-13 DIAGNOSIS — Z00.00 WELL ADULT EXAM: ICD-10-CM

## 2021-11-13 DIAGNOSIS — F90.0 ATTENTION DEFICIT HYPERACTIVITY DISORDER (ADHD), PREDOMINANTLY INATTENTIVE TYPE: ICD-10-CM

## 2021-11-13 LAB
ALBUMIN SERPL BCP-MCNC: 3.7 G/DL (ref 3.5–5)
ALP SERPL-CCNC: 34 U/L (ref 46–116)
ALT SERPL W P-5'-P-CCNC: 39 U/L (ref 12–78)
ANION GAP SERPL CALCULATED.3IONS-SCNC: 7 MMOL/L (ref 4–13)
AST SERPL W P-5'-P-CCNC: 15 U/L (ref 5–45)
BASOPHILS # BLD AUTO: 0.04 THOUSANDS/ΜL (ref 0–0.1)
BASOPHILS NFR BLD AUTO: 1 % (ref 0–1)
BILIRUB SERPL-MCNC: 0.52 MG/DL (ref 0.2–1)
BUN SERPL-MCNC: 27 MG/DL (ref 5–25)
CALCIUM SERPL-MCNC: 8.6 MG/DL (ref 8.3–10.1)
CHLORIDE SERPL-SCNC: 108 MMOL/L (ref 100–108)
CHOLEST SERPL-MCNC: 190 MG/DL (ref 50–200)
CO2 SERPL-SCNC: 28 MMOL/L (ref 21–32)
CREAT SERPL-MCNC: 1.12 MG/DL (ref 0.6–1.3)
EOSINOPHIL # BLD AUTO: 0.43 THOUSAND/ΜL (ref 0–0.61)
EOSINOPHIL NFR BLD AUTO: 8 % (ref 0–6)
ERYTHROCYTE [DISTWIDTH] IN BLOOD BY AUTOMATED COUNT: 12.1 % (ref 11.6–15.1)
GFR SERPL CREATININE-BSD FRML MDRD: 81 ML/MIN/1.73SQ M
GLUCOSE P FAST SERPL-MCNC: 97 MG/DL (ref 65–99)
HCT VFR BLD AUTO: 41.9 % (ref 36.5–49.3)
HDLC SERPL-MCNC: 25 MG/DL
HGB BLD-MCNC: 14 G/DL (ref 12–17)
IMM GRANULOCYTES # BLD AUTO: 0.02 THOUSAND/UL (ref 0–0.2)
IMM GRANULOCYTES NFR BLD AUTO: 0 % (ref 0–2)
LDLC SERPL CALC-MCNC: 142 MG/DL (ref 0–100)
LYMPHOCYTES # BLD AUTO: 1.25 THOUSANDS/ΜL (ref 0.6–4.47)
LYMPHOCYTES NFR BLD AUTO: 24 % (ref 14–44)
MCH RBC QN AUTO: 31.4 PG (ref 26.8–34.3)
MCHC RBC AUTO-ENTMCNC: 33.4 G/DL (ref 31.4–37.4)
MCV RBC AUTO: 94 FL (ref 82–98)
MONOCYTES # BLD AUTO: 0.37 THOUSAND/ΜL (ref 0.17–1.22)
MONOCYTES NFR BLD AUTO: 7 % (ref 4–12)
NEUTROPHILS # BLD AUTO: 3.2 THOUSANDS/ΜL (ref 1.85–7.62)
NEUTS SEG NFR BLD AUTO: 60 % (ref 43–75)
NONHDLC SERPL-MCNC: 165 MG/DL
NRBC BLD AUTO-RTO: 0 /100 WBCS
PLATELET # BLD AUTO: 188 THOUSANDS/UL (ref 149–390)
PMV BLD AUTO: 11 FL (ref 8.9–12.7)
POTASSIUM SERPL-SCNC: 4.8 MMOL/L (ref 3.5–5.3)
PROT SERPL-MCNC: 7.1 G/DL (ref 6.4–8.2)
RBC # BLD AUTO: 4.46 MILLION/UL (ref 3.88–5.62)
SODIUM SERPL-SCNC: 143 MMOL/L (ref 136–145)
TRIGL SERPL-MCNC: 116 MG/DL
TSH SERPL DL<=0.05 MIU/L-ACNC: 1.53 UIU/ML (ref 0.36–3.74)
WBC # BLD AUTO: 5.31 THOUSAND/UL (ref 4.31–10.16)

## 2021-11-13 PROCEDURE — 84443 ASSAY THYROID STIM HORMONE: CPT

## 2021-11-13 PROCEDURE — 80053 COMPREHEN METABOLIC PANEL: CPT

## 2021-11-13 PROCEDURE — 36415 COLL VENOUS BLD VENIPUNCTURE: CPT

## 2021-11-13 PROCEDURE — 85025 COMPLETE CBC W/AUTO DIFF WBC: CPT

## 2021-11-13 PROCEDURE — 80061 LIPID PANEL: CPT

## 2021-12-14 ENCOUNTER — ANESTHESIA EVENT (OUTPATIENT)
Dept: PERIOP | Facility: HOSPITAL | Age: 41
End: 2021-12-14
Payer: COMMERCIAL

## 2021-12-15 ENCOUNTER — HOSPITAL ENCOUNTER (OUTPATIENT)
Facility: HOSPITAL | Age: 41
Setting detail: OUTPATIENT SURGERY
Discharge: HOME | End: 2021-12-15
Attending: ORTHOPAEDIC SURGERY | Admitting: ORTHOPAEDIC SURGERY
Payer: COMMERCIAL

## 2021-12-15 ENCOUNTER — ANESTHESIA (OUTPATIENT)
Dept: PERIOP | Facility: HOSPITAL | Age: 41
End: 2021-12-15
Payer: COMMERCIAL

## 2021-12-15 ENCOUNTER — TELEPHONE (OUTPATIENT)
Dept: OBGYN CLINIC | Facility: OTHER | Age: 41
End: 2021-12-15

## 2021-12-15 VITALS
HEIGHT: 68 IN | OXYGEN SATURATION: 100 % | DIASTOLIC BLOOD PRESSURE: 81 MMHG | SYSTOLIC BLOOD PRESSURE: 125 MMHG | TEMPERATURE: 97 F | HEART RATE: 58 BPM | WEIGHT: 217.8 LBS | BODY MASS INDEX: 33.01 KG/M2 | RESPIRATION RATE: 13 BRPM

## 2021-12-15 DIAGNOSIS — Z98.890 S/P MENISCECTOMY: Primary | ICD-10-CM

## 2021-12-15 PROCEDURE — 29881 ARTHRS KNE SRG MNISECTMY M/L: CPT | Performed by: PHYSICIAN ASSISTANT

## 2021-12-15 PROCEDURE — 29881 ARTHRS KNE SRG MNISECTMY M/L: CPT | Performed by: ORTHOPAEDIC SURGERY

## 2021-12-15 PROCEDURE — NC001 PR NO CHARGE: Performed by: ORTHOPAEDIC SURGERY

## 2021-12-15 RX ORDER — FENTANYL CITRATE/PF 50 MCG/ML
25 SYRINGE (ML) INJECTION
Status: DISCONTINUED | OUTPATIENT
Start: 2021-12-15 | End: 2021-12-15 | Stop reason: HOSPADM

## 2021-12-15 RX ORDER — ONDANSETRON 2 MG/ML
INJECTION INTRAMUSCULAR; INTRAVENOUS AS NEEDED
Status: DISCONTINUED | OUTPATIENT
Start: 2021-12-15 | End: 2021-12-15

## 2021-12-15 RX ORDER — PROPOFOL 10 MG/ML
INJECTION, EMULSION INTRAVENOUS AS NEEDED
Status: DISCONTINUED | OUTPATIENT
Start: 2021-12-15 | End: 2021-12-15

## 2021-12-15 RX ORDER — OXYCODONE HYDROCHLORIDE 5 MG/1
5 TABLET ORAL EVERY 4 HOURS PRN
Qty: 15 TABLET | Refills: 0 | Status: SHIPPED | OUTPATIENT
Start: 2021-12-15 | End: 2022-04-25

## 2021-12-15 RX ORDER — LIDOCAINE HYDROCHLORIDE 10 MG/ML
INJECTION, SOLUTION EPIDURAL; INFILTRATION; INTRACAUDAL; PERINEURAL AS NEEDED
Status: DISCONTINUED | OUTPATIENT
Start: 2021-12-15 | End: 2021-12-15

## 2021-12-15 RX ORDER — DEXAMETHASONE SODIUM PHOSPHATE 4 MG/ML
INJECTION, SOLUTION INTRA-ARTICULAR; INTRALESIONAL; INTRAMUSCULAR; INTRAVENOUS; SOFT TISSUE AS NEEDED
Status: DISCONTINUED | OUTPATIENT
Start: 2021-12-15 | End: 2021-12-15

## 2021-12-15 RX ORDER — SODIUM CHLORIDE, SODIUM LACTATE, POTASSIUM CHLORIDE, CALCIUM CHLORIDE 600; 310; 30; 20 MG/100ML; MG/100ML; MG/100ML; MG/100ML
125 INJECTION, SOLUTION INTRAVENOUS CONTINUOUS
Status: DISCONTINUED | OUTPATIENT
Start: 2021-12-15 | End: 2021-12-15

## 2021-12-15 RX ORDER — ONDANSETRON 2 MG/ML
4 INJECTION INTRAMUSCULAR; INTRAVENOUS ONCE AS NEEDED
Status: DISCONTINUED | OUTPATIENT
Start: 2021-12-15 | End: 2021-12-15 | Stop reason: HOSPADM

## 2021-12-15 RX ORDER — CEFAZOLIN SODIUM 2 G/50ML
2000 SOLUTION INTRAVENOUS ONCE
Status: COMPLETED | OUTPATIENT
Start: 2021-12-15 | End: 2021-12-15

## 2021-12-15 RX ORDER — TRAMADOL HYDROCHLORIDE 50 MG/1
50 TABLET ORAL EVERY 6 HOURS PRN
Status: DISCONTINUED | OUTPATIENT
Start: 2021-12-15 | End: 2021-12-15 | Stop reason: HOSPADM

## 2021-12-15 RX ORDER — MIDAZOLAM HYDROCHLORIDE 2 MG/2ML
INJECTION, SOLUTION INTRAMUSCULAR; INTRAVENOUS AS NEEDED
Status: DISCONTINUED | OUTPATIENT
Start: 2021-12-15 | End: 2021-12-15

## 2021-12-15 RX ORDER — LIDOCAINE HYDROCHLORIDE 10 MG/ML
0.5 INJECTION, SOLUTION EPIDURAL; INFILTRATION; INTRACAUDAL; PERINEURAL ONCE AS NEEDED
Status: DISCONTINUED | OUTPATIENT
Start: 2021-12-15 | End: 2021-12-15 | Stop reason: HOSPADM

## 2021-12-15 RX ORDER — KETOROLAC TROMETHAMINE 30 MG/ML
INJECTION, SOLUTION INTRAMUSCULAR; INTRAVENOUS AS NEEDED
Status: DISCONTINUED | OUTPATIENT
Start: 2021-12-15 | End: 2021-12-15

## 2021-12-15 RX ORDER — FENTANYL CITRATE 50 UG/ML
INJECTION, SOLUTION INTRAMUSCULAR; INTRAVENOUS AS NEEDED
Status: DISCONTINUED | OUTPATIENT
Start: 2021-12-15 | End: 2021-12-15

## 2021-12-15 RX ORDER — GLYCOPYRROLATE 0.2 MG/ML
INJECTION INTRAMUSCULAR; INTRAVENOUS AS NEEDED
Status: DISCONTINUED | OUTPATIENT
Start: 2021-12-15 | End: 2021-12-15

## 2021-12-15 RX ORDER — ACETAMINOPHEN 325 MG/1
650 TABLET ORAL EVERY 6 HOURS PRN
Status: DISCONTINUED | OUTPATIENT
Start: 2021-12-15 | End: 2021-12-15 | Stop reason: HOSPADM

## 2021-12-15 RX ORDER — ASPIRIN 325 MG
325 TABLET, DELAYED RELEASE (ENTERIC COATED) ORAL DAILY
Qty: 30 TABLET | Refills: 0 | Status: SHIPPED | OUTPATIENT
Start: 2021-12-15 | End: 2022-04-25

## 2021-12-15 RX ORDER — SODIUM CHLORIDE, SODIUM LACTATE, POTASSIUM CHLORIDE, CALCIUM CHLORIDE 600; 310; 30; 20 MG/100ML; MG/100ML; MG/100ML; MG/100ML
125 INJECTION, SOLUTION INTRAVENOUS CONTINUOUS
Status: DISCONTINUED | OUTPATIENT
Start: 2021-12-15 | End: 2021-12-15 | Stop reason: HOSPADM

## 2021-12-15 RX ORDER — ALBUTEROL SULFATE 2.5 MG/3ML
2.5 SOLUTION RESPIRATORY (INHALATION) EVERY 4 HOURS PRN
Status: DISCONTINUED | OUTPATIENT
Start: 2021-12-15 | End: 2021-12-15 | Stop reason: HOSPADM

## 2021-12-15 RX ADMIN — MIDAZOLAM 2 MG: 1 INJECTION INTRAMUSCULAR; INTRAVENOUS at 09:39

## 2021-12-15 RX ADMIN — ONDANSETRON 4 MG: 2 INJECTION INTRAMUSCULAR; INTRAVENOUS at 10:26

## 2021-12-15 RX ADMIN — KETOROLAC TROMETHAMINE 15 MG: 30 INJECTION, SOLUTION INTRAMUSCULAR; INTRAVENOUS at 10:26

## 2021-12-15 RX ADMIN — TRAMADOL HYDROCHLORIDE 50 MG: 50 TABLET ORAL at 11:14

## 2021-12-15 RX ADMIN — GLYCOPYRROLATE 0.1 MG: 0.2 INJECTION, SOLUTION INTRAMUSCULAR; INTRAVENOUS at 09:39

## 2021-12-15 RX ADMIN — LIDOCAINE HYDROCHLORIDE 50 MG: 10 INJECTION, SOLUTION EPIDURAL; INFILTRATION; INTRACAUDAL; PERINEURAL at 09:46

## 2021-12-15 RX ADMIN — FENTANYL CITRATE 25 MCG: 50 INJECTION, SOLUTION INTRAMUSCULAR; INTRAVENOUS at 09:51

## 2021-12-15 RX ADMIN — DEXAMETHASONE SODIUM PHOSPHATE 8 MG: 4 INJECTION, SOLUTION INTRAMUSCULAR; INTRAVENOUS at 09:47

## 2021-12-15 RX ADMIN — CEFAZOLIN SODIUM 2000 MG: 2 SOLUTION INTRAVENOUS at 09:39

## 2021-12-15 RX ADMIN — SODIUM CHLORIDE, SODIUM LACTATE, POTASSIUM CHLORIDE, AND CALCIUM CHLORIDE 125 ML/HR: .6; .31; .03; .02 INJECTION, SOLUTION INTRAVENOUS at 08:41

## 2021-12-15 RX ADMIN — FENTANYL CITRATE 25 MCG: 50 INJECTION, SOLUTION INTRAMUSCULAR; INTRAVENOUS at 09:45

## 2021-12-15 RX ADMIN — PROPOFOL 200 MG: 10 INJECTION, EMULSION INTRAVENOUS at 09:46

## 2021-12-15 RX ADMIN — FENTANYL CITRATE 25 MCG: 50 INJECTION, SOLUTION INTRAMUSCULAR; INTRAVENOUS at 10:09

## 2021-12-15 RX ADMIN — FENTANYL CITRATE 25 MCG: 50 INJECTION, SOLUTION INTRAMUSCULAR; INTRAVENOUS at 09:59

## 2021-12-17 ENCOUNTER — TELEPHONE (OUTPATIENT)
Dept: OBGYN CLINIC | Facility: HOSPITAL | Age: 41
End: 2021-12-17

## 2021-12-23 ENCOUNTER — OFFICE VISIT (OUTPATIENT)
Dept: OBGYN CLINIC | Facility: MEDICAL CENTER | Age: 41
End: 2021-12-23

## 2021-12-23 ENCOUNTER — EVALUATION (OUTPATIENT)
Dept: PHYSICAL THERAPY | Facility: CLINIC | Age: 41
End: 2021-12-23
Payer: COMMERCIAL

## 2021-12-23 VITALS
BODY MASS INDEX: 32.89 KG/M2 | HEIGHT: 68 IN | SYSTOLIC BLOOD PRESSURE: 147 MMHG | HEART RATE: 75 BPM | WEIGHT: 217 LBS | DIASTOLIC BLOOD PRESSURE: 79 MMHG

## 2021-12-23 DIAGNOSIS — S83.207D ACUTE MENISCAL TEAR OF LEFT KNEE, SUBSEQUENT ENCOUNTER: Primary | ICD-10-CM

## 2021-12-23 DIAGNOSIS — Z98.890 S/P MENISCECTOMY: Primary | ICD-10-CM

## 2021-12-23 PROCEDURE — 99024 POSTOP FOLLOW-UP VISIT: CPT | Performed by: ORTHOPAEDIC SURGERY

## 2021-12-23 PROCEDURE — 97161 PT EVAL LOW COMPLEX 20 MIN: CPT

## 2021-12-23 PROCEDURE — 97112 NEUROMUSCULAR REEDUCATION: CPT

## 2021-12-30 ENCOUNTER — OFFICE VISIT (OUTPATIENT)
Dept: PHYSICAL THERAPY | Facility: CLINIC | Age: 41
End: 2021-12-30
Payer: COMMERCIAL

## 2021-12-30 DIAGNOSIS — Z98.890 S/P MENISCECTOMY: Primary | ICD-10-CM

## 2021-12-30 PROCEDURE — 97112 NEUROMUSCULAR REEDUCATION: CPT

## 2021-12-30 PROCEDURE — 97110 THERAPEUTIC EXERCISES: CPT

## 2022-01-05 ENCOUNTER — OFFICE VISIT (OUTPATIENT)
Dept: PHYSICAL THERAPY | Facility: CLINIC | Age: 42
End: 2022-01-05
Payer: COMMERCIAL

## 2022-01-05 DIAGNOSIS — Z98.890 S/P MENISCECTOMY: Primary | ICD-10-CM

## 2022-01-05 PROCEDURE — 97112 NEUROMUSCULAR REEDUCATION: CPT

## 2022-01-05 PROCEDURE — 97110 THERAPEUTIC EXERCISES: CPT

## 2022-01-05 NOTE — PROGRESS NOTES
Daily Note     Today's date: 2022  Patient name: Teri Hernandez  : 1980  MRN: [id number]  Referring provider: Ruby Alfaro,*  Dx:   Encounter Diagnosis     ICD-10-CM    1  S/P meniscectomy  Z98 890        Start Time: 1740  Stop Time: 1806  Total time in clinic (min): 26 minutes    Subjective: Pt reported his knee felt the best it's felt after last session  He reports kneeling on his knee without thinking much about it but noticing a moderate amount of pain and tenderness; pt was educated on avoiding this position in the future as the knee continues to heal following surgery  Objective: See treatment diary below      Assessment: Tolerated treatment well  Patient continued to be challenged by the sidelying hip abduction with weight, but displayed good form  Darin Rivers were progressed this session with the addition of the pball for a straight legged bridge; pt displayed fatigue with muscle fasciculations on the final repetitions of the last set  SL deadlifts were trialed without weight; pt reported some hamstring fatigue but displayed good form with minor hip opening at the top of the motion  Pt trialed leg press without any pain  Suitcase carries continued to challenge pt with some instability/LOB noted during the final set  Continue to progress pt as tolerated  Plan: Continue per plan of care        Precautions: ADHD, s/p ACL tear      Manuals           Patellar mobs nv NS           Knee gapping mob  NS                                     Neuro Re-Ed           SLR 1x15; HEP            Bridges 1x15; HEP  2x10 pball 3" hold          sidelying abduction nv 2x15 2 5# 2x15 2 5#          Standing hip flexion with resistance nv 2x20 btb                                                  Ther Ex           Seated HS curls nv btb 2x15           Leg press nv  115# 2x10          Lateral walks nv 1x5 btb back and forth at mirror            Hip 3 ways nv SL DL   No weight 1x10 each side                                                 Ther Activity 12/23 12/30 1/5          Suitcase marches nv 2x28' 8#, 18# 2x28' 18#          Sit to stand 1x15; HEP            Step downs nv 2x15 2R 2x15 2R each side          Gait Training 12/23 12/30 1/5                                    Modalities 12/23 12/30 1/5

## 2022-01-12 ENCOUNTER — APPOINTMENT (OUTPATIENT)
Dept: PHYSICAL THERAPY | Facility: CLINIC | Age: 42
End: 2022-01-12
Payer: COMMERCIAL

## 2022-01-12 NOTE — PROGRESS NOTES
Daily Note     Today's date: 2022  Patient name: Lorenzo Babb  : 1980  MRN: [id number]  Referring provider: Quiana Paz,*  Dx: No diagnosis found  Subjective: ***      Objective: See treatment diary below      Assessment: Tolerated treatment {Tolerated treatment :[id number]}  Patient {assessment:[id number]}      Plan: Continue per plan of care        Precautions: ADHD, s/p ACL tear      Manuals          Patellar mobs nv NS           Knee gapping mob  NS                                     Neuro Re-Ed          SLR 1x15; HEP            Bridges 1x15; HEP  2x10 pball 3" hold          sidelying abduction nv 2x15 2 5# 2x15 2 5#          Standing hip flexion with resistance nv 2x20 btb                                                  Ther Ex          Seated HS curls nv btb 2x15           Leg press nv  115# 2x10          Lateral walks nv 1x5 btb back and forth at mirror            Hip 3 ways nv            SL DL   No weight 1x10 each side                                                 Ther Activity          Suitcase marches nv 2x28' 8#, 18# 2x28' 18#          Sit to stand 1x15; HEP            Step downs nv 2x15 2R 2x15 2R each side          Gait Training                                    Modalities

## 2022-01-19 ENCOUNTER — APPOINTMENT (OUTPATIENT)
Dept: PHYSICAL THERAPY | Facility: CLINIC | Age: 42
End: 2022-01-19
Payer: COMMERCIAL

## 2022-01-26 ENCOUNTER — OFFICE VISIT (OUTPATIENT)
Dept: PHYSICAL THERAPY | Facility: CLINIC | Age: 42
End: 2022-01-26
Payer: COMMERCIAL

## 2022-01-26 DIAGNOSIS — Z98.890 S/P MENISCECTOMY: Primary | ICD-10-CM

## 2022-01-26 PROCEDURE — 97112 NEUROMUSCULAR REEDUCATION: CPT

## 2022-01-26 PROCEDURE — 97110 THERAPEUTIC EXERCISES: CPT

## 2022-01-26 NOTE — PROGRESS NOTES
Daily Note     Today's date: 2022  Patient name: Juanito Clifton  : 1980  MRN: [id number]  Referring provider: Mau Hanson,*  Dx:   Encounter Diagnosis     ICD-10-CM    1  S/P meniscectomy  Z98 890        Start Time:   Stop Time: 1803  Total time in clinic (min): 25 minutes    Subjective: Pt reports he's doing well with no issues, but he's had off from therapy for close to a month due to busyness and being sick  Objective: See treatment diary below      Assessment: Tolerated treatment well  Patient reported lateral hip fatigue following sidelying abduction and suitcase marches  Lunges were added to exercises with pt displaying good depth  SLDL were progressed with cone taps to challenge pt's stability; two to three LOBs were noted with each set  Continue to progress pt as tolerated next visit  Plan: Continue per plan of care        Precautions: ADHD, s/p ACL tear      Manuals          Patellar mobs nv NS           Knee gapping mob  NS                                     Neuro Re-Ed          SLR 1x15; HEP            Bridges 1x15; HEP  2x10 pball 3" hold          sidelying abduction nv 2x15 2 5# 2x15 2 5# 2x15 2 5# each side         Standing hip flexion with resistance nv 2x20 btb           Supine marching with resistance    gtb 2x20                                   Ther Ex          Seated HS curls nv btb 2x15           Leg press nv  115# 2x10          Lateral walks nv 1x5 btb back and forth at mirror            Hip 3 ways nv            SL DL   No weight 1x10 each side Cone taps 5 rounds          bike    4'                                   Ther Activity          Suitcase marches nv 2x28' 8#, 18# 2x28' 18# 2x28' 18#         Sit to stand 1x15; HEP            Lunges    2x10          Step downs nv 2x15 2R 2x15 2R each side          Gait Training  Modalities 12/23 12/30 1/5 1/26

## 2022-03-07 ENCOUNTER — OFFICE VISIT (OUTPATIENT)
Dept: OBGYN CLINIC | Facility: MEDICAL CENTER | Age: 42
End: 2022-03-07

## 2022-03-07 VITALS
SYSTOLIC BLOOD PRESSURE: 134 MMHG | TEMPERATURE: 98.3 F | HEIGHT: 68 IN | BODY MASS INDEX: 32.89 KG/M2 | HEART RATE: 97 BPM | DIASTOLIC BLOOD PRESSURE: 85 MMHG | WEIGHT: 217 LBS

## 2022-03-07 DIAGNOSIS — Z98.890 S/P MENISCECTOMY: Primary | ICD-10-CM

## 2022-03-07 PROCEDURE — 99024 POSTOP FOLLOW-UP VISIT: CPT | Performed by: ORTHOPAEDIC SURGERY

## 2022-03-07 NOTE — PROGRESS NOTES
Knee Post Operative Visit     Assesment:     42 y o  male 12 weeks s/p Surgical Arthroscopy of the left knee with medial meniscectomy, 12/15/2021    Plan:    Post-Operative treatment:     Let pain guide gradual return activities  Imaging:    No imaging was available for review today  Weight bearing:  as tolerated     ROM:  Full    Brace:  No brace needed    DVT Prophylaxis:  Ambulation    Follow up:   6 weeks    Patient was advised that if they have any fevers, chills, chest pain, shortness of breath, redness or drainage from the incision, please let our office know immediately  Chief Complaint   Patient presents with    Left Knee - Follow-up       History of Present Illness: The patient is a 42 y o  male who is being evaluated post operatively 12 weeks s/p Surgical Arthroscopy of the left knee with medial meniscectomy, 12/15/2021  Since the prior visit, He reports significant improvement  He has some minimal pain on the anterior aspect of the knee while kneeling  Pain is well controlled  The patient is using ice to control swelling  They have started physical therapy  The patient Ambulation for DVT ppx  The patient has been ambulating without crutches  The patient has been ambulating without a brace  The patient denies any fevers, chills, calf pain, chest pain/shortness of breath, redness or drainage from the incision  I have reviewed the past medical, surgical, social and family history, medications and allergies as documented in the EMR  Review of systems: ROS is negative other than that noted in the HPI  Constitutional: Negative for fatigue and fever  Physical Exam:    Blood pressure 134/85, pulse 97, temperature 98 3 °F (36 8 °C), height 5' 8" (1 727 m), weight 98 4 kg (217 lb)      General/Constitutional: NAD, well developed, well nourished  HENT: Normocephalic, atraumatic  CV: Intact distal pulses, regular rate  Resp: No respiratory distress or labored breathing  Lymphatic: No lymphadenopathy palpated  Neuro: Alert and Oriented x 3, no focal deficits  Psych: Normal mood, normal affect, normal judgement, normal behavior  Skin: Warm, dry, no rashes, no erythema      Knee Exam (focused):                   RIGHT LEFT   ROM:   0-130 0-130   Palpation: Effusion negative negative     MJL tenderness Negative Negative     LJL tenderness Negative Negative   Instability: Varus stable stable     Valgus stable stable   Special Tests: Lachman Negative Negative     Posterior drawer Negative Negative     Anterior drawer Negative Negative     Pivot shift not tested not tested     Dial not tested not tested   Patella: Palpation no tenderness no tenderness     Mobility 1/4 1/4     Apprehension Negative Negative   Other: Single leg 1/4 squat not tested not tested      Incisions show no erythema, no drainage    LE NV Exam: +2 DP/PT pulses bilaterally  Sensation intact to light touch L2-S1 bilaterally     Bilateral hip ROM demonstrates no pain actively or passively    No calf tenderness to palpation bilaterally    Scribe Attestation    I,:  Aliya Oliva am acting as a scribe while in the presence of the attending physician :       I,:  Kadie Reynolds DO personally performed the services described in this documentation    as scribed in my presence :

## 2022-04-25 ENCOUNTER — OFFICE VISIT (OUTPATIENT)
Dept: FAMILY MEDICINE CLINIC | Facility: CLINIC | Age: 42
End: 2022-04-25
Payer: COMMERCIAL

## 2022-04-25 VITALS
TEMPERATURE: 98.2 F | HEART RATE: 48 BPM | DIASTOLIC BLOOD PRESSURE: 86 MMHG | WEIGHT: 220 LBS | OXYGEN SATURATION: 97 % | SYSTOLIC BLOOD PRESSURE: 134 MMHG | RESPIRATION RATE: 18 BRPM | BODY MASS INDEX: 33.34 KG/M2 | HEIGHT: 68 IN

## 2022-04-25 DIAGNOSIS — F11.20 CONTINUOUS OPIOID DEPENDENCE (HCC): Primary | ICD-10-CM

## 2022-04-25 DIAGNOSIS — F90.0 ATTENTION DEFICIT HYPERACTIVITY DISORDER (ADHD), PREDOMINANTLY INATTENTIVE TYPE: ICD-10-CM

## 2022-04-25 PROCEDURE — 99213 OFFICE O/P EST LOW 20 MIN: CPT | Performed by: FAMILY MEDICINE

## 2022-04-25 PROCEDURE — 3008F BODY MASS INDEX DOCD: CPT | Performed by: FAMILY MEDICINE

## 2022-04-25 PROCEDURE — 4004F PT TOBACCO SCREEN RCVD TLK: CPT | Performed by: FAMILY MEDICINE

## 2022-04-25 RX ORDER — DEXTROAMPHETAMINE SACCHARATE, AMPHETAMINE ASPARTATE, DEXTROAMPHETAMINE SULFATE AND AMPHETAMINE SULFATE 5; 5; 5; 5 MG/1; MG/1; MG/1; MG/1
1 TABLET ORAL 2 TIMES DAILY
Qty: 60 TABLET | Refills: 0 | Status: SHIPPED | OUTPATIENT
Start: 2022-04-25

## 2022-04-25 NOTE — PROGRESS NOTES
Assessment/Plan:       Diagnoses and all orders for this visit:    Continuous opioid dependence (HCC)    Attention deficit hyperactivity disorder (ADHD), predominantly inattentive type  -     amphetamine-dextroamphetamine (ADDERALL) 20 mg tablet; Take 1 tablet (20 mg total) by mouth 2 (two) times a day Max Daily Amount: 40 mg            Subjective:        Patient ID: Jay Jay Gauthier is a 42 y o  male  Patient follow-up on ADHD  No insomnia  Patient does use Adderall intermittently for certain period of time for concentration  Patient takes weekly to every other week  No chest pain or palpitations shortness of breath  Patient able to concentrate well  The following portions of the patient's history were reviewed and updated as appropriate: allergies, current medications, past family history, past medical history, past social history, past surgical history and problem list       Review of Systems   Constitutional: Negative  HENT: Negative  Eyes: Negative  Respiratory: Negative  Cardiovascular: Negative  Gastrointestinal: Negative  Endocrine: Negative  Genitourinary: Negative  Musculoskeletal: Negative  Skin: Negative  Allergic/Immunologic: Negative  Neurological: Negative  Hematological: Negative  Psychiatric/Behavioral: Negative  Objective:      BMI Counseling: Body mass index is 33 45 kg/m²  The BMI is above normal  Nutrition recommendations include decreasing portion sizes  Exercise recommendations include moderate physical activity 150 minutes/week  Rationale for BMI follow-up plan is due to patient being overweight or obese  Depression Screening and Follow-up Plan: Clincally patient does not have depression  No treatment is required  Tobacco Cessation Counseling: Tobacco cessation counseling was provided   The patient is sincerely urged to quit consumption of tobacco  He is not ready to quit tobacco            /86 (BP Location: Left arm, Patient Position: Sitting, Cuff Size: Adult)   Pulse (!) 48   Temp 98 2 °F (36 8 °C) (Tympanic)   Resp 18   Ht 5' 8" (1 727 m)   Wt 99 8 kg (220 lb)   SpO2 97%   BMI 33 45 kg/m²          Physical Exam  Vitals and nursing note reviewed  Constitutional:       General: He is not in acute distress  Appearance: Normal appearance  He is not ill-appearing, toxic-appearing or diaphoretic  HENT:      Head: Normocephalic and atraumatic  Cardiovascular:      Rate and Rhythm: Normal rate and regular rhythm  Pulses: Normal pulses  Heart sounds: Normal heart sounds  Pulmonary:      Effort: Pulmonary effort is normal       Breath sounds: Normal breath sounds  Neurological:      Mental Status: He is alert  Psychiatric:         Mood and Affect: Mood normal          Behavior: Behavior normal          Thought Content:  Thought content normal          Judgment: Judgment normal

## 2022-08-10 ENCOUNTER — OFFICE VISIT (OUTPATIENT)
Dept: FAMILY MEDICINE CLINIC | Facility: CLINIC | Age: 42
End: 2022-08-10
Payer: COMMERCIAL

## 2022-08-10 VITALS
BODY MASS INDEX: 32.89 KG/M2 | OXYGEN SATURATION: 99 % | TEMPERATURE: 96.1 F | SYSTOLIC BLOOD PRESSURE: 120 MMHG | HEIGHT: 68 IN | WEIGHT: 217 LBS | HEART RATE: 76 BPM | DIASTOLIC BLOOD PRESSURE: 80 MMHG

## 2022-08-10 DIAGNOSIS — Z00.00 WELL ADULT EXAM: ICD-10-CM

## 2022-08-10 DIAGNOSIS — Z12.5 PROSTATE CANCER SCREENING: ICD-10-CM

## 2022-08-10 DIAGNOSIS — S39.012A STRAIN OF LUMBAR REGION, INITIAL ENCOUNTER: Primary | ICD-10-CM

## 2022-08-10 PROCEDURE — 99214 OFFICE O/P EST MOD 30 MIN: CPT | Performed by: FAMILY MEDICINE

## 2022-08-10 NOTE — PROGRESS NOTES
Assessment/Plan: We talked about low back pain  I recommend home stretching and strengthening exercises  Consider referral to physical therapy  Work on diet exercise and weight loss  I did write for labs he will return for a general physical exam at his convenience  Diagnoses and all orders for this visit:    Strain of lumbar region, initial encounter    Well adult exam  -     Lipid panel  -     Comprehensive metabolic panel  -     CBC and differential  -     TSH, 3rd generation with Free T4 reflex  -     PSA Total, Diagnostic    Prostate cancer screening  -     PSA Total, Diagnostic        Subjective:     Patient ID: Gracia Carrasco is a 42 y o  male  Complains of right-sided low back pain over last 3 weeks or so  He does not remember any specific trauma  His work involves a lot of sitting  No complaints of radiation of the pain  Review of Systems   Constitutional: Negative  HENT: Negative  Eyes: Negative  Respiratory: Negative  Cardiovascular: Negative  Gastrointestinal: Negative  Endocrine: Negative  Genitourinary: Negative  Musculoskeletal: Positive for back pain  Skin: Negative  Allergic/Immunologic: Negative  Neurological: Negative  Hematological: Negative  Psychiatric/Behavioral: Negative  All other systems reviewed and are negative  Objective:     Physical Exam  Vitals and nursing note reviewed  Constitutional:       Appearance: He is well-developed  HENT:      Head: Normocephalic and atraumatic  Eyes:      Pupils: Pupils are equal, round, and reactive to light  Cardiovascular:      Rate and Rhythm: Normal rate  Pulmonary:      Effort: Pulmonary effort is normal       Breath sounds: No wheezing  Abdominal:      Palpations: Abdomen is soft  Musculoskeletal:      Cervical back: Normal range of motion and neck supple  Lumbar back: Spasms present  No swelling, deformity or tenderness  Decreased range of motion   Positive left straight leg raise test    Lymphadenopathy:      Cervical: No cervical adenopathy  Skin:     General: Skin is warm  Neurological:      Mental Status: He is alert and oriented to person, place, and time

## 2022-10-12 PROBLEM — Z00.00 WELL ADULT EXAM: Status: RESOLVED | Noted: 2021-10-04 | Resolved: 2022-10-12

## 2022-11-16 ENCOUNTER — RA CDI HCC (OUTPATIENT)
Dept: OTHER | Facility: HOSPITAL | Age: 42
End: 2022-11-16

## 2022-12-22 ENCOUNTER — OFFICE VISIT (OUTPATIENT)
Dept: FAMILY MEDICINE CLINIC | Facility: CLINIC | Age: 42
End: 2022-12-22

## 2022-12-22 VITALS
SYSTOLIC BLOOD PRESSURE: 120 MMHG | TEMPERATURE: 97.6 F | HEIGHT: 68 IN | HEART RATE: 76 BPM | OXYGEN SATURATION: 95 % | BODY MASS INDEX: 33.43 KG/M2 | WEIGHT: 220.6 LBS | DIASTOLIC BLOOD PRESSURE: 88 MMHG

## 2022-12-22 DIAGNOSIS — Z00.00 WELL ADULT EXAM: Primary | ICD-10-CM

## 2022-12-22 DIAGNOSIS — F90.0 ATTENTION DEFICIT HYPERACTIVITY DISORDER (ADHD), PREDOMINANTLY INATTENTIVE TYPE: ICD-10-CM

## 2022-12-22 RX ORDER — DEXTROAMPHETAMINE SACCHARATE, AMPHETAMINE ASPARTATE, DEXTROAMPHETAMINE SULFATE AND AMPHETAMINE SULFATE 5; 5; 5; 5 MG/1; MG/1; MG/1; MG/1
1 TABLET ORAL 2 TIMES DAILY
Qty: 60 TABLET | Refills: 0 | Status: SHIPPED | OUTPATIENT
Start: 2022-12-22

## 2022-12-22 NOTE — PROGRESS NOTES
Assessment/Plan: Vaccines up-to-date  Patient will have laboratory studies done  Patient will have PSA for prostate cancer screening  No family history of colorectal cancer or prostate cancer  Other guidance given at this time regarding weight loss and dietary measures  Follow-up in 6 months  Diagnoses and all orders for this visit:    Well adult exam    Attention deficit hyperactivity disorder (ADHD), predominantly inattentive type  -     amphetamine-dextroamphetamine (ADDERALL) 20 mg tablet; Take 1 tablet (20 mg total) by mouth 2 (two) times a day Max Daily Amount: 40 mg            Subjective:        Patient ID: Flor Crespo is a 36 y o  male  Patient is here for wellness exam   Labs and vaccines reviewed  No early family history of colon or prostate cancer  The following portions of the patient's history were reviewed and updated as appropriate: allergies, current medications, past family history, past medical history, past social history, past surgical history and problem list       Review of Systems   Constitutional: Negative  HENT: Negative  Eyes: Negative  Respiratory: Negative  Cardiovascular: Negative  Gastrointestinal: Negative  Endocrine: Negative  Genitourinary: Negative  Musculoskeletal: Negative  Skin: Negative  Allergic/Immunologic: Negative  Neurological: Negative  Hematological: Negative  Psychiatric/Behavioral: Negative  Objective:               /88 (BP Location: Left arm, Patient Position: Sitting, Cuff Size: Large)   Pulse 76   Temp 97 6 °F (36 4 °C) (Temporal)   Ht 5' 8" (1 727 m)   Wt 100 kg (220 lb 9 6 oz)   SpO2 95%   BMI 33 54 kg/m²          Physical Exam  Vitals and nursing note reviewed  Constitutional:       General: He is not in acute distress  Appearance: Normal appearance  He is not ill-appearing, toxic-appearing or diaphoretic  HENT:      Head: Normocephalic and atraumatic        Right Ear: Tympanic membrane, ear canal and external ear normal  There is no impacted cerumen  Left Ear: Tympanic membrane, ear canal and external ear normal  There is no impacted cerumen  Nose: Nose normal  No congestion or rhinorrhea  Mouth/Throat:      Mouth: Mucous membranes are moist       Pharynx: No oropharyngeal exudate or posterior oropharyngeal erythema  Eyes:      General: No scleral icterus  Right eye: No discharge  Left eye: No discharge  Extraocular Movements: Extraocular movements intact  Conjunctiva/sclera: Conjunctivae normal       Pupils: Pupils are equal, round, and reactive to light  Neck:      Vascular: No carotid bruit  Cardiovascular:      Rate and Rhythm: Normal rate and regular rhythm  Pulses: Normal pulses  Heart sounds: Normal heart sounds  No murmur heard  No friction rub  No gallop  Pulmonary:      Effort: Pulmonary effort is normal  No respiratory distress  Breath sounds: Normal breath sounds  No stridor  No wheezing, rhonchi or rales  Chest:      Chest wall: No tenderness  Abdominal:      General: Abdomen is flat  Bowel sounds are normal  There is no distension  Palpations: Abdomen is soft  Tenderness: There is no abdominal tenderness  There is no guarding or rebound  Musculoskeletal:         General: No swelling, tenderness, deformity or signs of injury  Normal range of motion  Cervical back: Normal range of motion and neck supple  No rigidity  No muscular tenderness  Right lower leg: No edema  Left lower leg: No edema  Lymphadenopathy:      Cervical: No cervical adenopathy  Skin:     General: Skin is warm and dry  Capillary Refill: Capillary refill takes less than 2 seconds  Coloration: Skin is not jaundiced  Findings: No bruising, erythema, lesion or rash  Neurological:      Mental Status: He is alert and oriented to person, place, and time  Mental status is at baseline  Cranial Nerves: No cranial nerve deficit  Sensory: No sensory deficit  Motor: No weakness  Coordination: Coordination normal       Gait: Gait normal    Psychiatric:         Mood and Affect: Mood normal          Behavior: Behavior normal          Thought Content:  Thought content normal          Judgment: Judgment normal

## 2023-02-20 PROBLEM — Z00.00 WELL ADULT EXAM: Status: RESOLVED | Noted: 2022-12-22 | Resolved: 2023-02-20

## 2023-06-15 ENCOUNTER — OFFICE VISIT (OUTPATIENT)
Dept: FAMILY MEDICINE CLINIC | Facility: CLINIC | Age: 43
End: 2023-06-15
Payer: COMMERCIAL

## 2023-06-15 VITALS
DIASTOLIC BLOOD PRESSURE: 90 MMHG | HEIGHT: 68 IN | OXYGEN SATURATION: 98 % | SYSTOLIC BLOOD PRESSURE: 150 MMHG | BODY MASS INDEX: 34.07 KG/M2 | WEIGHT: 224.8 LBS | HEART RATE: 60 BPM | TEMPERATURE: 98 F

## 2023-06-15 DIAGNOSIS — L85.8 KERATOSIS PILARIS: ICD-10-CM

## 2023-06-15 DIAGNOSIS — R03.0 ELEVATED BLOOD PRESSURE READING: ICD-10-CM

## 2023-06-15 DIAGNOSIS — F90.0 ATTENTION DEFICIT HYPERACTIVITY DISORDER (ADHD), PREDOMINANTLY INATTENTIVE TYPE: Primary | ICD-10-CM

## 2023-06-15 DIAGNOSIS — F11.20 CONTINUOUS OPIOID DEPENDENCE (HCC): ICD-10-CM

## 2023-06-15 PROCEDURE — 99214 OFFICE O/P EST MOD 30 MIN: CPT | Performed by: FAMILY MEDICINE

## 2023-06-15 RX ORDER — DEXTROAMPHETAMINE SACCHARATE, AMPHETAMINE ASPARTATE, DEXTROAMPHETAMINE SULFATE AND AMPHETAMINE SULFATE 5; 5; 5; 5 MG/1; MG/1; MG/1; MG/1
1 TABLET ORAL 2 TIMES DAILY
Qty: 60 TABLET | Refills: 0 | Status: SHIPPED | OUTPATIENT
Start: 2023-06-15

## 2023-06-15 RX ORDER — AMMONIUM LACTATE 12 G/100G
CREAM TOPICAL AS NEEDED
Qty: 385 G | Refills: 3 | Status: SHIPPED | OUTPATIENT
Start: 2023-06-15

## 2023-06-15 NOTE — PROGRESS NOTES
Assessment/Plan: Patient will try to lose weight, exercise, have low-salt diet and limit alcohol intake  Patient will continue with Adderall as needed for ADHD  Refills given at this time  Patient use Lac-Hydrin cream as directed  Follow-up in 6 months       Diagnoses and all orders for this visit:    Attention deficit hyperactivity disorder (ADHD), predominantly inattentive type  -     amphetamine-dextroamphetamine (ADDERALL) 20 mg tablet; Take 1 tablet (20 mg total) by mouth 2 (two) times a day Max Daily Amount: 40 mg    Continuous opioid dependence (HCC)    Elevated blood pressure reading    Keratosis pilaris  -     ammonium lactate (LAC-HYDRIN) 12 % cream; Apply topically as needed for dry skin            Subjective:        Patient ID: Marcio Bryant is a 36 y o  male  Patient is here to follow-up on ADHD  Patient also with elevated blood pressure  No new chest pain shortness of breath palpitations headaches  Vision stable  No change with urination or defecation  No lower extremity edema  Patient did have caffeine this morning  Patient able to focus and concentrate well and complete tasks with Adderall  Patient with rash on flanks bilaterally  The following portions of the patient's history were reviewed and updated as appropriate: allergies, current medications, past family history, past medical history, past social history, past surgical history and problem list       Review of Systems   Constitutional: Negative  HENT: Negative  Eyes: Negative  Respiratory: Negative  Cardiovascular: Negative  Gastrointestinal: Negative  Endocrine: Negative  Genitourinary: Negative  Musculoskeletal: Negative  Skin: Positive for rash  Allergic/Immunologic: Negative  Neurological: Negative  Hematological: Negative  Psychiatric/Behavioral: Negative  Objective:      BMI Counseling: Body mass index is 34 18 kg/m²   The BMI is above normal  Nutrition "recommendations include consuming healthier snacks  Exercise recommendations include moderate physical activity 150 minutes/week  Rationale for BMI follow-up plan is due to patient being overweight or obese  Depression Screening and Follow-up Plan: Patient was screened for depression during today's encounter  They screened negative with a PHQ-2 score of 0  Tobacco Cessation Counseling: Tobacco cessation counseling was provided  The patient is sincerely urged to quit consumption of tobacco  He is not ready to quit tobacco              /90 (BP Location: Left arm, Patient Position: Sitting, Cuff Size: Standard)   Pulse 60   Temp 98 °F (36 7 °C) (Temporal)   Ht 5' 8\" (1 727 m)   Wt 102 kg (224 lb 12 8 oz)   SpO2 98%   BMI 34 18 kg/m²          Physical Exam  Vitals and nursing note reviewed  Constitutional:       General: He is not in acute distress  Appearance: Normal appearance  He is not ill-appearing, toxic-appearing or diaphoretic  HENT:      Head: Normocephalic and atraumatic  Right Ear: Tympanic membrane, ear canal and external ear normal  There is no impacted cerumen  Left Ear: Tympanic membrane, ear canal and external ear normal  There is no impacted cerumen  Nose: Nose normal  No congestion or rhinorrhea  Mouth/Throat:      Mouth: Mucous membranes are moist       Pharynx: No oropharyngeal exudate or posterior oropharyngeal erythema  Eyes:      General: No scleral icterus  Right eye: No discharge  Left eye: No discharge  Extraocular Movements: Extraocular movements intact  Conjunctiva/sclera: Conjunctivae normal       Pupils: Pupils are equal, round, and reactive to light  Neck:      Vascular: No carotid bruit  Cardiovascular:      Rate and Rhythm: Normal rate and regular rhythm  Pulses: Normal pulses  Heart sounds: Normal heart sounds  No murmur heard  No friction rub  No gallop     Pulmonary:      Effort: Pulmonary " effort is normal  No respiratory distress  Breath sounds: Normal breath sounds  No stridor  No wheezing, rhonchi or rales  Chest:      Chest wall: No tenderness  Musculoskeletal:         General: No swelling, tenderness, deformity or signs of injury  Normal range of motion  Cervical back: Normal range of motion and neck supple  No rigidity  No muscular tenderness  Right lower leg: No edema  Left lower leg: No edema  Lymphadenopathy:      Cervical: No cervical adenopathy  Skin:     General: Skin is warm and dry  Capillary Refill: Capillary refill takes less than 2 seconds  Coloration: Skin is not jaundiced  Findings: Rash present  No bruising, erythema or lesion  Comments: Keratosis pilaris   Neurological:      Mental Status: He is alert and oriented to person, place, and time  Mental status is at baseline  Cranial Nerves: No cranial nerve deficit  Sensory: No sensory deficit  Motor: No weakness  Coordination: Coordination normal       Gait: Gait normal    Psychiatric:         Mood and Affect: Mood normal          Behavior: Behavior normal          Thought Content:  Thought content normal          Judgment: Judgment normal

## 2024-01-18 ENCOUNTER — OFFICE VISIT (OUTPATIENT)
Dept: FAMILY MEDICINE CLINIC | Facility: CLINIC | Age: 44
End: 2024-01-18
Payer: COMMERCIAL

## 2024-01-18 VITALS
TEMPERATURE: 97.8 F | HEART RATE: 82 BPM | DIASTOLIC BLOOD PRESSURE: 82 MMHG | BODY MASS INDEX: 34.86 KG/M2 | OXYGEN SATURATION: 98 % | SYSTOLIC BLOOD PRESSURE: 130 MMHG | HEIGHT: 68 IN | WEIGHT: 230 LBS

## 2024-01-18 DIAGNOSIS — B35.6 TINEA CRURIS: ICD-10-CM

## 2024-01-18 DIAGNOSIS — F11.20 CONTINUOUS OPIOID DEPENDENCE (HCC): ICD-10-CM

## 2024-01-18 DIAGNOSIS — F90.0 ATTENTION DEFICIT HYPERACTIVITY DISORDER (ADHD), PREDOMINANTLY INATTENTIVE TYPE: ICD-10-CM

## 2024-01-18 DIAGNOSIS — Z00.00 WELL ADULT EXAM: Primary | ICD-10-CM

## 2024-01-18 PROCEDURE — 99396 PREV VISIT EST AGE 40-64: CPT | Performed by: FAMILY MEDICINE

## 2024-01-18 RX ORDER — DEXTROAMPHETAMINE SACCHARATE, AMPHETAMINE ASPARTATE, DEXTROAMPHETAMINE SULFATE AND AMPHETAMINE SULFATE 5; 5; 5; 5 MG/1; MG/1; MG/1; MG/1
1 TABLET ORAL 2 TIMES DAILY
Qty: 60 TABLET | Refills: 0 | Status: SHIPPED | OUTPATIENT
Start: 2024-01-18

## 2024-01-18 NOTE — PROGRESS NOTES
Assessment/Plan: Vaccines reviewed and up-to-date.  Patient will get laboratory studies done.  No early family history of colon or prostate cancer.  Patient was try to exercise and diet appropriately.       Diagnoses and all orders for this visit:    Well adult exam  -     CBC and differential; Future  -     Comprehensive metabolic panel; Future  -     TSH, 3rd generation with Free T4 reflex; Future  -     Lipid panel; Future    Attention deficit hyperactivity disorder (ADHD), predominantly inattentive type  -     amphetamine-dextroamphetamine (ADDERALL) 20 mg tablet; Take 1 tablet (20 mg total) by mouth 2 (two) times a day Max Daily Amount: 40 mg    Tinea cruris  -     econazole nitrate 1 % cream; Apply topically daily    Continuous opioid dependence (HCC)            Subjective:        Patient ID: Momo Cr is a 43 y.o. male.      Patient is here for wellness exam.  Labs and vaccines reviewed.  No early family history of colorectal cancer.  No early family history of prostate cancer.          The following portions of the patient's history were reviewed and updated as appropriate: allergies, current medications, past family history, past medical history, past social history, past surgical history and problem list.      Review of Systems   Constitutional: Negative.    HENT: Negative.     Eyes: Negative.    Respiratory: Negative.     Cardiovascular: Negative.    Gastrointestinal: Negative.    Endocrine: Negative.    Genitourinary: Negative.    Musculoskeletal: Negative.    Skin: Negative.    Allergic/Immunologic: Negative.    Neurological: Negative.    Hematological: Negative.    Psychiatric/Behavioral: Negative.             Objective:        Tobacco Cessation Counseling: Tobacco cessation counseling was provided. The patient is sincerely urged to quit consumption of tobacco. He is not ready to quit tobacco. Medication options discussed.             /82 (BP Location: Right arm, Patient Position: Sitting,  "Cuff Size: Standard)   Pulse 82   Temp 97.8 °F (36.6 °C) (Temporal)   Ht 5' 8\" (1.727 m)   Wt 104 kg (230 lb)   SpO2 98%   BMI 34.97 kg/m²          Physical Exam  Vitals and nursing note reviewed.   Constitutional:       General: He is not in acute distress.     Appearance: Normal appearance. He is not ill-appearing, toxic-appearing or diaphoretic.   HENT:      Head: Normocephalic and atraumatic.      Right Ear: Tympanic membrane, ear canal and external ear normal. There is no impacted cerumen.      Left Ear: Tympanic membrane, ear canal and external ear normal. There is no impacted cerumen.      Nose: Nose normal. No congestion or rhinorrhea.      Mouth/Throat:      Mouth: Mucous membranes are moist.      Pharynx: No oropharyngeal exudate or posterior oropharyngeal erythema.   Eyes:      General: No scleral icterus.        Right eye: No discharge.         Left eye: No discharge.   Neck:      Vascular: No carotid bruit.   Cardiovascular:      Rate and Rhythm: Normal rate and regular rhythm.      Pulses: Normal pulses.      Heart sounds: Normal heart sounds. No murmur heard.     No friction rub. No gallop.   Pulmonary:      Effort: Pulmonary effort is normal. No respiratory distress.      Breath sounds: Normal breath sounds. No stridor. No wheezing, rhonchi or rales.   Chest:      Chest wall: No tenderness.   Musculoskeletal:         General: No swelling, tenderness, deformity or signs of injury. Normal range of motion.      Cervical back: Normal range of motion and neck supple. No rigidity. No muscular tenderness.      Right lower leg: No edema.      Left lower leg: No edema.   Lymphadenopathy:      Cervical: No cervical adenopathy.   Skin:     General: Skin is warm and dry.      Capillary Refill: Capillary refill takes less than 2 seconds.      Coloration: Skin is not jaundiced.      Findings: Rash present. No bruising, erythema or lesion.      Comments: Tinea cruris   Neurological:      Mental Status: He is " alert and oriented to person, place, and time. Mental status is at baseline.      Cranial Nerves: No cranial nerve deficit.      Sensory: No sensory deficit.      Motor: No weakness.      Coordination: Coordination normal.      Gait: Gait normal.   Psychiatric:         Mood and Affect: Mood normal.         Behavior: Behavior normal.         Thought Content: Thought content normal.         Judgment: Judgment normal.

## 2024-02-21 PROBLEM — Z00.00 WELL ADULT EXAM: Status: RESOLVED | Noted: 2024-01-18 | Resolved: 2024-02-21

## 2024-07-17 ENCOUNTER — RA CDI HCC (OUTPATIENT)
Dept: OTHER | Facility: HOSPITAL | Age: 44
End: 2024-07-17

## 2024-07-17 PROBLEM — S83.207A ACUTE MENISCAL TEAR OF LEFT KNEE: Status: RESOLVED | Noted: 2019-07-15 | Resolved: 2024-07-17

## 2024-07-22 ENCOUNTER — OFFICE VISIT (OUTPATIENT)
Dept: FAMILY MEDICINE CLINIC | Facility: CLINIC | Age: 44
End: 2024-07-22
Payer: COMMERCIAL

## 2024-07-22 VITALS
HEART RATE: 79 BPM | WEIGHT: 231 LBS | SYSTOLIC BLOOD PRESSURE: 142 MMHG | HEIGHT: 68 IN | OXYGEN SATURATION: 97 % | DIASTOLIC BLOOD PRESSURE: 88 MMHG | TEMPERATURE: 98.2 F | BODY MASS INDEX: 35.01 KG/M2

## 2024-07-22 DIAGNOSIS — M54.16 LUMBAR RADICULITIS: ICD-10-CM

## 2024-07-22 DIAGNOSIS — F11.20 CONTINUOUS OPIOID DEPENDENCE (HCC): ICD-10-CM

## 2024-07-22 DIAGNOSIS — F90.0 ATTENTION DEFICIT HYPERACTIVITY DISORDER (ADHD), PREDOMINANTLY INATTENTIVE TYPE: Primary | ICD-10-CM

## 2024-07-22 DIAGNOSIS — M70.41 PREPATELLAR BURSITIS OF RIGHT KNEE: ICD-10-CM

## 2024-07-22 DIAGNOSIS — R03.0 ELEVATED BLOOD PRESSURE READING: ICD-10-CM

## 2024-07-22 PROCEDURE — 99214 OFFICE O/P EST MOD 30 MIN: CPT | Performed by: FAMILY MEDICINE

## 2024-07-22 RX ORDER — MELOXICAM 15 MG/1
15 TABLET ORAL DAILY
Qty: 30 TABLET | Refills: 1 | Status: SHIPPED | OUTPATIENT
Start: 2024-07-22

## 2024-07-22 RX ORDER — DEXTROAMPHETAMINE SACCHARATE, AMPHETAMINE ASPARTATE, DEXTROAMPHETAMINE SULFATE AND AMPHETAMINE SULFATE 5; 5; 5; 5 MG/1; MG/1; MG/1; MG/1
1 TABLET ORAL 2 TIMES DAILY
Qty: 60 TABLET | Refills: 0 | Status: SHIPPED | OUTPATIENT
Start: 2024-07-22

## 2024-07-22 NOTE — PROGRESS NOTES
Assessment/Plan: Labs records reviewed.  Patient will stop ibuprofen and use meloxicam for lumbar radiculitis as well as prepatellar bursitis of the right knee.  Guidance given.  Patient use ice as directed.  Patient will continue with Adderall for ADHD.  Patient states ADHD stable at this time.  Patient will follow-up in 6 months or as needed.  Patient will have blood pressure rechecked at follow-up.  Low-salt diet recommended.       Diagnoses and all orders for this visit:    Attention deficit hyperactivity disorder (ADHD), predominantly inattentive type  -     amphetamine-dextroamphetamine (ADDERALL) 20 mg tablet; Take 1 tablet (20 mg total) by mouth 2 (two) times a day Max Daily Amount: 40 mg    Prepatellar bursitis of right knee  -     meloxicam (MOBIC) 15 mg tablet; Take 1 tablet (15 mg total) by mouth daily    Lumbar radiculitis  -     meloxicam (MOBIC) 15 mg tablet; Take 1 tablet (15 mg total) by mouth daily    Continuous opioid dependence (HCC)    Elevated blood pressure reading            Subjective:        Patient ID: Momo Cr is a 43 y.o. male.      Patient is here to follow-up on ADHD.  Patient does use medication intermittently when needed for work.  Patient will need refill.  No headache or chest pain shortness of breath or change in vision on medication.  No change in urination or defecation.  Patient is status post volleyball injury coming down on right knee.  Patient with right knee pain 3 weeks ago.  Patient with swelling of the right knee.  Back pain for roughly 10 days.  Pain is midline.  Some radicular symptoms in the legs.  Patient using Advil.          The following portions of the patient's history were reviewed and updated as appropriate: allergies, current medications, past family history, past medical history, past social history, past surgical history and problem list.      Review of Systems   Constitutional: Negative.    HENT: Negative.     Eyes: Negative.    Respiratory: Negative.  "    Cardiovascular: Negative.    Gastrointestinal: Negative.    Endocrine: Negative.    Genitourinary: Negative.    Musculoskeletal:  Positive for arthralgias, back pain and gait problem.   Skin: Negative.    Allergic/Immunologic: Negative.    Hematological: Negative.    Psychiatric/Behavioral: Negative.             Objective:        Depression Screening and Follow-up Plan: Patient was screened for depression during today's encounter. They screened negative with a PHQ-2 score of 0.            BP (!) 160/110 (BP Location: Right arm, Patient Position: Sitting, Cuff Size: Standard)   Pulse 79   Temp 98.2 °F (36.8 °C) (Temporal)   Ht 5' 8\" (1.727 m)   Wt 105 kg (231 lb)   SpO2 97%   BMI 35.12 kg/m²          Physical Exam  Vitals and nursing note reviewed.   Constitutional:       General: He is not in acute distress.     Appearance: Normal appearance. He is not ill-appearing, toxic-appearing or diaphoretic.   HENT:      Head: Normocephalic and atraumatic.      Right Ear: Tympanic membrane, ear canal and external ear normal. There is no impacted cerumen.      Left Ear: Tympanic membrane, ear canal and external ear normal. There is no impacted cerumen.      Nose: Nose normal. No congestion or rhinorrhea.      Mouth/Throat:      Mouth: Mucous membranes are moist.      Pharynx: No oropharyngeal exudate or posterior oropharyngeal erythema.   Eyes:      General: No scleral icterus.        Right eye: No discharge.         Left eye: No discharge.   Neck:      Vascular: No carotid bruit.   Cardiovascular:      Rate and Rhythm: Normal rate and regular rhythm.      Pulses: Normal pulses.      Heart sounds: Normal heart sounds. No murmur heard.     No friction rub. No gallop.   Pulmonary:      Effort: Pulmonary effort is normal. No respiratory distress.      Breath sounds: Normal breath sounds. No stridor. No wheezing, rhonchi or rales.   Chest:      Chest wall: No tenderness.   Musculoskeletal:         General: Tenderness " present. No swelling, deformity or signs of injury.      Cervical back: Normal range of motion and neck supple. No rigidity. No muscular tenderness.      Right lower leg: No edema.      Left lower leg: No edema.      Comments: Back pain midline lower lumbar region.  No significant paravertebral muscle spasm.  Right knee prepatellar swelling noted.  Some pain associated with this.  Negative Lachman test.  Negative Apley test.  No joint line tenderness.  No calf tenderness.   Lymphadenopathy:      Cervical: No cervical adenopathy.   Skin:     General: Skin is warm and dry.      Capillary Refill: Capillary refill takes less than 2 seconds.      Coloration: Skin is not jaundiced.      Findings: No bruising, erythema, lesion or rash.   Neurological:      Mental Status: He is alert and oriented to person, place, and time.      Cranial Nerves: No cranial nerve deficit.      Sensory: No sensory deficit.      Motor: No weakness.      Coordination: Coordination normal.      Gait: Gait normal.   Psychiatric:         Mood and Affect: Mood normal.         Behavior: Behavior normal.         Thought Content: Thought content normal.         Judgment: Judgment normal.

## (undated) DEVICE — INTENDED FOR TISSUE SEPARATION, AND OTHER PROCEDURES THAT REQUIRE A SHARP SURGICAL BLADE TO PUNCTURE OR CUT.: Brand: BARD-PARKER ® CARBON RIB-BACK BLADES

## (undated) DEVICE — SPONGE SCRUB 4 PCT CHLORHEXIDINE

## (undated) DEVICE — BETHLEHEM UNIV MAJ EXT ,KIT: Brand: CARDINAL HEALTH

## (undated) DEVICE — BANDAGE, ESMARK LF STR 6"X9' (20/CS): Brand: CYPRESS

## (undated) DEVICE — CHLORAPREP HI-LITE 26ML ORANGE

## (undated) DEVICE — FILTER STRAW 1.7

## (undated) DEVICE — SURGICAL GOWN, XL SMARTSLEEVE: Brand: CONVERTORS

## (undated) DEVICE — BLADE SHAVER DISSECTOR  4MM 13CM CRV COOLCUT

## (undated) DEVICE — GLOVE SRG BIOGEL 8.5

## (undated) DEVICE — ARTHROSCOPY FLOOR MAT

## (undated) DEVICE — SYRINGE 30ML LL

## (undated) DEVICE — SURGI KIT INSTRUMENT ORGANIZER

## (undated) DEVICE — PUDDLE VAC

## (undated) DEVICE — GLOVE INDICATOR PI UNDERGLOVE SZ 8.5 BLUE

## (undated) DEVICE — GLOVE SRG BIOGEL 7

## (undated) DEVICE — SYRINGE 3ML LL

## (undated) DEVICE — SUT MONOCRYL 4-0 PS-2 27 IN Y426H

## (undated) DEVICE — ACE WRAP 6 IN UNSTERILE

## (undated) DEVICE — 3M™ STERI-STRIP™ REINFORCED ADHESIVE SKIN CLOSURES, R1547, 1/2 IN X 4 IN (12 MM X 100 MM), 6 STRIPS/ENVELOPE: Brand: 3M™ STERI-STRIP™

## (undated) DEVICE — OCCLUSIVE GAUZE STRIP,3% BISMUTH TRIBROMOPHENATE IN PETROLATUM BLEND: Brand: XEROFORM

## (undated) DEVICE — TUBING ARTHROSCOPIC WAVE  MAIN PUMP

## (undated) DEVICE — CAST PADDING 6 IN STERILE

## (undated) DEVICE — NEEDLE 18 G X 1 1/2

## (undated) DEVICE — GLOVE INDICATOR PI UNDERGLOVE SZ 7.5 BLUE

## (undated) DEVICE — NEEDLE SPINAL18G X 3.5 IN QUINCKE

## (undated) DEVICE — CUFF TOURNIQUET 30 X 4 IN QUICK CONNECT DISP 1BLA

## (undated) DEVICE — GLOVE INDICATOR PI UNDERGLOVE SZ 7 BLUE

## (undated) DEVICE — TUBING SUCTION 5MM X 12 FT